# Patient Record
Sex: MALE | Race: WHITE | NOT HISPANIC OR LATINO | URBAN - METROPOLITAN AREA
[De-identification: names, ages, dates, MRNs, and addresses within clinical notes are randomized per-mention and may not be internally consistent; named-entity substitution may affect disease eponyms.]

---

## 2019-06-10 ENCOUNTER — HISTORICAL (OUTPATIENT)
Dept: ADMINISTRATIVE | Facility: HOSPITAL | Age: 29
End: 2019-06-10

## 2019-06-10 LAB
ABS NEUT (OLG): 3.1 X10(3)/MCL (ref 2.1–9.2)
ALBUMIN SERPL-MCNC: 4.8 GM/DL (ref 3.4–5)
ALBUMIN/GLOB SERPL: 1.85 {RATIO} (ref 1.5–2.5)
ALP SERPL-CCNC: 39 UNIT/L (ref 38–126)
ALT SERPL-CCNC: 41 UNIT/L (ref 7–52)
APPEARANCE, UA: CLEAR
AST SERPL-CCNC: 28 UNIT/L (ref 15–37)
BACTERIA #/AREA URNS AUTO: NORMAL /HPF
BILIRUB SERPL-MCNC: 0.4 MG/DL (ref 0.2–1)
BILIRUB UR QL STRIP: NEGATIVE MG/DL
BUN SERPL-MCNC: 14 MG/DL (ref 7–18)
CALCIUM SERPL-MCNC: 9.6 MG/DL (ref 8.5–10)
CHLORIDE SERPL-SCNC: 101 MMOL/L (ref 98–107)
CO2 SERPL-SCNC: 26 MMOL/L (ref 21–32)
COLOR UR: YELLOW
CREAT SERPL-MCNC: 1.03 MG/DL (ref 0.6–1.3)
DEPRECATED CALCIDIOL+CALCIFEROL SERPL-MC: 30 NG/ML (ref 30–80)
ERYTHROCYTE [DISTWIDTH] IN BLOOD BY AUTOMATED COUNT: 12.9 % (ref 11.5–17)
GLOBULIN SER-MCNC: 2.6 GM/DL (ref 1.2–3)
GLUCOSE (UA): NEGATIVE MG/DL
GLUCOSE SERPL-MCNC: 97 MG/DL (ref 74–106)
HCT VFR BLD AUTO: 41.9 % (ref 42–52)
HGB BLD-MCNC: 14.1 GM/DL (ref 14–18)
HGB UR QL STRIP: NEGATIVE UNIT/L
KETONES UR QL STRIP: NEGATIVE MG/DL
LEUKOCYTE ESTERASE UR QL STRIP: NEGATIVE UNIT/L
LYMPHOCYTES # BLD AUTO: 2.3 X10(3)/MCL (ref 0.6–3.4)
LYMPHOCYTES NFR BLD AUTO: 39.1 % (ref 13–40)
MCH RBC QN AUTO: 29.4 PG (ref 27–31.2)
MCHC RBC AUTO-ENTMCNC: 34 GM/DL (ref 32–36)
MCV RBC AUTO: 88 FL (ref 80–94)
MONOCYTES # BLD AUTO: 0.6 X10(3)/MCL (ref 0.1–1.3)
MONOCYTES NFR BLD AUTO: 10.4 % (ref 0.1–24)
NEUTROPHILS NFR BLD AUTO: 50.5 % (ref 47–80)
NITRITE UR QL STRIP.AUTO: NEGATIVE
PH UR STRIP: 6 [PH]
PLATELET # BLD AUTO: 188 X10(3)/MCL (ref 130–400)
PMV BLD AUTO: 9.1 FL (ref 9.4–12.4)
POTASSIUM SERPL-SCNC: 4.3 MMOL/L (ref 3.5–5.1)
PROT SERPL-MCNC: 7.4 GM/DL (ref 6.4–8.2)
PROT UR QL STRIP: NEGATIVE MG/DL
RBC # BLD AUTO: 4.79 X10(6)/MCL (ref 4.7–6.1)
RBC #/AREA URNS HPF: NORMAL /HPF
SODIUM SERPL-SCNC: 136 MMOL/L (ref 136–145)
SP GR UR STRIP: 1.02
SQUAMOUS EPITHELIAL, UA: NORMAL /LPF
TSH SERPL-ACNC: 0.76 MIU/ML (ref 0.35–4.94)
UROBILINOGEN UR STRIP-ACNC: 0.2 MG/DL
WBC # SPEC AUTO: 6 X10(3)/MCL (ref 4.5–11.5)
WBC #/AREA URNS AUTO: NORMAL /[HPF]

## 2022-04-07 ENCOUNTER — HISTORICAL (OUTPATIENT)
Dept: ADMINISTRATIVE | Facility: HOSPITAL | Age: 32
End: 2022-04-07

## 2022-04-24 VITALS
DIASTOLIC BLOOD PRESSURE: 61 MMHG | BODY MASS INDEX: 20.73 KG/M2 | HEIGHT: 62 IN | SYSTOLIC BLOOD PRESSURE: 110 MMHG | WEIGHT: 112.63 LBS

## 2022-05-01 NOTE — HISTORICAL [facility]
This is a historical note converted from Mayela. Formatting and pictures may have been removed.  Please reference Mayela for original formatting and attached multimedia. Chief Complaint  MEDICATION REFILL  History of Present Illness  Patient here to follow-up regarding his anxiety. ?He is using Valium?on an as-needed basis, but reports using it more frequently lately. ?He reports an increase in anxiety?and?now depression?over the past month. ?He increased his Zoloft?to 100 mg 2 to 3 weeks ago. ?He denies any suicidal or homicidal ideations. ?He reports increased fatigue?and?decrease?interest in his previous interests. ?He continues to live in [place]?and will be leaving [place] on June 18. He is unsure as to?when he?will return home.?He does not have an established medical provider?in [facility].  ?  He reports having a previous problem?with kidney stones. ?In April he reports a gross amount of blood in his urine.? He denies any recent?blood in his urine.? He denies any flank pain.  Review of Systems  General:?? Patient reports energy level is ?poor. Denies weight change.??Denies fever,chills, night sweats, or weakness. ?Reports fatigue.  Cardiovascular:?? Denies chest pain, palpitations, dyspnea on exertion, orthopnea.  Respiratory:?? No cough, wheezing, shortness of breath, or sputum.  GI:?? Denies nausea, emesis, constipation, diarrhea, melena, hematochezia or abdominal pain  :?? No frequency, urgency, hematuria, discharge, or incontinence  MS:?? Denies myalgias, arthralgias, joint effusion, edema, or weakness  Neuro:?? No headaches, numbness in extremities, tingling, dizziness, or weakness  Psych:?? Denies anxiety, depression, suicidal or homicidal ideations, or irritability  Endo:?? Denies polyuria, polydipsia, polyphagia  Heme:?? No abnormal bleeding or bruising. No lymph node enlargement or pain.  ?  Physical Exam  Vitals & Measurements  HR:?80(Peripheral)? BP:?106/62?  HT:?157.48?cm?  WT:?46.3?kg? BMI:?18.67?  General:?? Well-developed and??nourished, no apparent distress, alert and oriented??4.  Neck:?? Supple, no lymphadenopathy, no thyromegaly, no bruits, no jugular venous distention.  Cardiovascular:?? Regular rhythm and rate, no murmurs, radial and dorsal pedal pulses 2+ bilaterally.  Respiratory:?? Lungs clear to auscultation bilaterally, no wheezes, no crackles, no rhonchi.??Good air movement.  Abdomen:?? NABS, soft, nontender, no hepatosplenomegaly, no masses, no guarding or rebound.?  MS:?? No muscle tenderness, joints WN L, FROM, negative SLR, no?CCE.  Neuro:?? CN II-XII intact, reflexes 2+ throughout, no motor sensory deficits, negative cerebellar??tests.  Psych: Normal affect, patient calm, good historian, patient answers questions?appropriately. Good hygiene.  Heme:? No bruising or petechiae.  ?  Assessment/Plan  1.?Anxiety?F41.9  ?Will decrease Zoloft back down to 50 mg. ?Patient will start Wellbutrin XL?150 mg?daily 30x1.? Side effects with the medication were discussed which included?the potential for weight loss.??He is aware that he will need to?increase his intake to help maintain his weight. ?He is also?aware of the risk of developing suicidal or harmful ideations. ?He was instructed to stop the medication and/or seek emergency care if needed. ?He verbalized understanding.??He was also given information?on resources?available?to help him find a counselor or psychologist?in [facility].? He will find a primary care provider in [facility]?to follow-up with in 4 to 6 weeks.  ?  Valium?10 mg?#30?1 refill.  Ordered:  buPROPion, 150 mg = 1 tab(s), Oral, q24hr, # 30 tab(s), 1 Refill(s), Pharmacy: [facility]/pharmacy #[id number]  Comprehensive Metabolic Panel, Routine collect, 06/10/19 11:02:00 CDT, Blood, Stop date 06/10/19 11:02:00 CDT, Lab Collect, Fatigue  Anxiety  Depression  Hematuria, 06/10/19 11:02:00 CDT  Office/Outpatient Visit Level 4 Established 64480 PC, Anxiety  Depression   Hematuria  Fatigue, [facility], 06/10/19 10:51:00 CDT  Thyroid Stimulating Hormone, Routine collect, 06/10/19 11:02:00 CDT, Blood, Stop date 06/10/19 11:02:00 CDT, Lab Collect, Fatigue  Anxiety  Depression, 06/10/19 11:02:00 CDT  ?  2.?Depression?F32.9  ?As above  Ordered:  buPROPion, 150 mg = 1 tab(s), Oral, q24hr, # 30 tab(s), 1 Refill(s), Pharmacy: [facility]/pharmacy #[id number]  Comprehensive Metabolic Panel, Routine collect, 06/10/19 11:02:00 CDT, Blood, Stop date 06/10/19 11:02:00 CDT, Lab Collect, Fatigue  Anxiety  Depression  Hematuria, 06/10/19 11:02:00 CDT  Office/Outpatient Visit Level 4 Established 27276 PC, Anxiety  Depression  Hematuria  Fatigue, [facility], 06/10/19 10:51:00 CDT  Thyroid Stimulating Hormone, Routine collect, 06/10/19 11:02:00 CDT, Blood, Stop date 06/10/19 11:02:00 CDT, Lab Collect, Fatigue  Anxiety  Depression, 06/10/19 11:02:00 CDT  ?  3.?Hematuria?R31.9  ?Urinalysis today.  Ordered:  Comprehensive Metabolic Panel, Routine collect, 06/10/19 11:02:00 CDT, Blood, Stop date 06/10/19 11:02:00 CDT, Lab Collect, Fatigue  Anxiety  Depression  Hematuria, 06/10/19 11:02:00 CDT  Office/Outpatient Visit Level 4 Established 17148 PC, Anxiety  Depression  Hematuria  Fatigue, [facility], 06/10/19 10:51:00 CDT  Urinalysis Complete no reflex, Routine collect, Urine, 06/10/19 11:02:00 CDT, Stop date 06/10/19 11:02:00 CDT, Nurse collect, Hematuria  ?  4.?Fatigue?R53.83  ?Labs:?CBC, CMP,?TSH, vitamin D today.  Ordered:  Comprehensive Metabolic Panel, Routine collect, 06/10/19 11:02:00 CDT, Blood, Stop date 06/10/19 11:02:00 CDT, Lab Collect, Fatigue  Anxiety  Depression  Hematuria, 06/10/19 11:02:00 CDT  Office/Outpatient Visit Level 4 Established 04939 PC, Anxiety  Depression  Hematuria  Fatigue, [facility], 06/10/19 10:51:00 CDT  Thyroid Stimulating Hormone, Routine collect, 06/10/19 11:02:00 CDT, Blood, Stop date 06/10/19 11:02:00 CDT, Lab Collect, Fatigue  Anxiety   Depression, 06/10/19 11:02:00 CDT  Vitamin D, 25-Hydroxy Level, Routine collect, 06/10/19 11:02:00 CDT, Blood, Stop date 06/10/19 11:02:00 CDT, Lab Collect, Fatigue, 06/10/19 11:02:00 CDT  ?  5.?Weight loss?R63.4  ?As above. ?We discussed ways?to?help improve his diet?to help increase his weight.  ?  Orders:  diazepam, See Instructions, TAKE 1 TABLET BY MOUTH EVERY 8 HOURS AS NEEDED FOR ANXIETY, # 30 tab(s), 0 Refill(s), Pharmacy: [facility]/pharmacy #[id number]  sertraline, 50 mg = 1 tab(s), Oral, Daily, # 90 tab(s), 0 Refill(s), Pharmacy: [facility]/pharmacy #[id number]  Clinic Follow-up PRN, 06/10/19 10:51:00 CDT, [facility], Future Order  Referrals  Clinic Follow-up PRN, 06/10/19 10:51:00 CDT, [facility], Future Order   Problem List/Past Medical History  Ongoing  Anxiety  Weight loss  Historical  No qualifying data  Medications  DIAZepam 10 mg oral tablet, See Instructions  sertraline 50 mg oral tablet, 50 mg= 1 tab(s), Oral, Daily  Wellbutrin  mg/24 hours oral tablet, extended release, 150 mg= 1 tab(s), Oral, q24hr, 1 refills  Allergies  No Known Medication Allergies  Social History  Abuse/Neglect  No, 06/10/2019  Alcohol  1-2 times per month, 06/10/2019  Employment/School  Employed, Work/School description: Actor., 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 ??OverDue  ??? ? ? ?Alcohol Misuse Screening due??01/01/19??and every 1??year(s)  ??? ??Due?  ??? ? ? ?ADL Screening due??06/10/19??and every 1??year(s)  ??? ? ? ?Tetanus Vaccine due??06/10/19??and every 10??year(s)  ??? ??Due In Future?  ??? ? ? ?Obesity Screening not due until??01/01/20??and every 1??year(s)  ??? ? ? ?Blood Pressure Screening not due until??06/09/20??and every 1??year(s)  ??? ? ? ?Body Mass Index  Check not due until??06/09/20??and every 1??year(s)  ???Satisfied?(in the past 1 year)  ??? ??Satisfied?  ??? ? ? ?Blood Pressure Screening on??06/10/19.??Satisfied by Graciela Gutierrez LPN  ??? ? ? ?Body Mass Index Check on??06/10/19.??Satisfied by Graciela Gutierrez LPN  ??? ? ? ?Obesity Screening on??06/10/19.??Satisfied by Graciela Gutierrez LPN  ?  ?      Patient condition discussed?in detail with nurse practitioner.??Agree with plan of care?and follow-up.  ?

## 2022-10-11 ENCOUNTER — OFFICE VISIT (OUTPATIENT)
Dept: FAMILY MEDICINE | Facility: CLINIC | Age: 32
End: 2022-10-11
Payer: COMMERCIAL

## 2022-10-11 VITALS
SYSTOLIC BLOOD PRESSURE: 105 MMHG | HEART RATE: 80 BPM | HEIGHT: 62 IN | OXYGEN SATURATION: 94 % | TEMPERATURE: 99 F | DIASTOLIC BLOOD PRESSURE: 76 MMHG | BODY MASS INDEX: 20.48 KG/M2 | RESPIRATION RATE: 20 BRPM | WEIGHT: 111.31 LBS

## 2022-10-11 DIAGNOSIS — Z00.00 ENCOUNTER FOR WELLNESS EXAMINATION: Primary | ICD-10-CM

## 2022-10-11 DIAGNOSIS — F41.9 ANXIETY: ICD-10-CM

## 2022-10-11 DIAGNOSIS — F32.A DEPRESSION, UNSPECIFIED DEPRESSION TYPE: ICD-10-CM

## 2022-10-11 LAB
ALBUMIN SERPL-MCNC: 4.5 GM/DL (ref 3.5–5)
ALBUMIN/GLOB SERPL: 1.6 RATIO (ref 1.1–2)
ALP SERPL-CCNC: 56 UNIT/L (ref 40–150)
ALT SERPL-CCNC: 13 UNIT/L (ref 0–55)
APPEARANCE UR: CLEAR
AST SERPL-CCNC: 18 UNIT/L (ref 5–34)
BACTERIA #/AREA URNS AUTO: ABNORMAL /HPF
BASOPHILS # BLD AUTO: 0.05 X10(3)/MCL (ref 0–0.2)
BASOPHILS NFR BLD AUTO: 0.7 %
BILIRUB UR QL STRIP.AUTO: NEGATIVE MG/DL
BILIRUBIN DIRECT+TOT PNL SERPL-MCNC: 0.2 MG/DL
BUN SERPL-MCNC: 21.9 MG/DL (ref 8.9–20.6)
CALCIUM SERPL-MCNC: 9.9 MG/DL (ref 8.4–10.2)
CHLORIDE SERPL-SCNC: 103 MMOL/L (ref 98–107)
CHOLEST SERPL-MCNC: 189 MG/DL
CHOLEST/HDLC SERPL: 3 {RATIO} (ref 0–5)
CO2 SERPL-SCNC: 27 MMOL/L (ref 22–29)
COLOR UR AUTO: YELLOW
CREAT SERPL-MCNC: 1 MG/DL (ref 0.73–1.18)
EOSINOPHIL # BLD AUTO: 0.14 X10(3)/MCL (ref 0–0.9)
EOSINOPHIL NFR BLD AUTO: 1.9 %
ERYTHROCYTE [DISTWIDTH] IN BLOOD BY AUTOMATED COUNT: 12.2 % (ref 11.5–17)
EST. AVERAGE GLUCOSE BLD GHB EST-MCNC: 99.7 MG/DL
GFR SERPLBLD CREATININE-BSD FMLA CKD-EPI: >60 MLS/MIN/1.73/M2
GLOBULIN SER-MCNC: 2.9 GM/DL (ref 2.4–3.5)
GLUCOSE SERPL-MCNC: 89 MG/DL (ref 74–100)
GLUCOSE UR QL STRIP.AUTO: NORMAL MG/DL
HAV IGM SERPL QL IA: NONREACTIVE
HBA1C MFR BLD: 5.1 %
HBV CORE IGM SERPL QL IA: NONREACTIVE
HBV SURFACE AG SERPL QL IA: NONREACTIVE
HCT VFR BLD AUTO: 38.9 % (ref 42–52)
HCV AB SERPL QL IA: NONREACTIVE
HDLC SERPL-MCNC: 56 MG/DL (ref 35–60)
HGB BLD-MCNC: 12.8 GM/DL (ref 14–18)
HIV 1+2 AB+HIV1 P24 AG SERPL QL IA: NONREACTIVE
HYALINE CASTS #/AREA URNS LPF: ABNORMAL /LPF
IMM GRANULOCYTES # BLD AUTO: 0.01 X10(3)/MCL (ref 0–0.04)
IMM GRANULOCYTES NFR BLD AUTO: 0.1 %
KETONES UR QL STRIP.AUTO: NEGATIVE MG/DL
LDLC SERPL CALC-MCNC: 101 MG/DL (ref 50–140)
LEUKOCYTE ESTERASE UR QL STRIP.AUTO: NEGATIVE UNIT/L
LYMPHOCYTES # BLD AUTO: 2.89 X10(3)/MCL (ref 0.6–4.6)
LYMPHOCYTES NFR BLD AUTO: 40.3 %
MCH RBC QN AUTO: 28.4 PG (ref 27–31)
MCHC RBC AUTO-ENTMCNC: 32.9 MG/DL (ref 33–36)
MCV RBC AUTO: 86.3 FL (ref 80–94)
MONOCYTES # BLD AUTO: 0.6 X10(3)/MCL (ref 0.1–1.3)
MONOCYTES NFR BLD AUTO: 8.4 %
MUCOUS THREADS URNS QL MICRO: ABNORMAL /LPF
NEUTROPHILS # BLD AUTO: 3.5 X10(3)/MCL (ref 2.1–9.2)
NEUTROPHILS NFR BLD AUTO: 48.6 %
NITRITE UR QL STRIP.AUTO: NEGATIVE
NRBC BLD AUTO-RTO: 0 %
PATH REV: NORMAL
PH UR STRIP.AUTO: 6 [PH]
PLATELET # BLD AUTO: 184 X10(3)/MCL (ref 130–400)
PMV BLD AUTO: 10.1 FL (ref 7.4–10.4)
POTASSIUM SERPL-SCNC: 4.6 MMOL/L (ref 3.5–5.1)
PROT SERPL-MCNC: 7.4 GM/DL (ref 6.4–8.3)
PROT UR QL STRIP.AUTO: NEGATIVE MG/DL
RBC # BLD AUTO: 4.51 X10(6)/MCL (ref 4.7–6.1)
RBC #/AREA URNS AUTO: ABNORMAL /HPF
RBC UR QL AUTO: NEGATIVE UNIT/L
SODIUM SERPL-SCNC: 140 MMOL/L (ref 136–145)
SP GR UR STRIP.AUTO: 1.02
SQUAMOUS #/AREA URNS LPF: ABNORMAL /HPF
T PALLIDUM AB SER QL: NONREACTIVE
T4 FREE SERPL-MCNC: 0.98 NG/DL (ref 0.7–1.48)
TRIGL SERPL-MCNC: 162 MG/DL (ref 34–140)
TSH SERPL-ACNC: 1.27 UIU/ML (ref 0.35–4.94)
UROBILINOGEN UR STRIP-ACNC: NORMAL MG/DL
VLDLC SERPL CALC-MCNC: 32 MG/DL
WBC # SPEC AUTO: 7.2 X10(3)/MCL (ref 4.5–11.5)
WBC #/AREA URNS AUTO: ABNORMAL /HPF

## 2022-10-11 PROCEDURE — 3078F DIAST BP <80 MM HG: CPT | Mod: CPTII,,,

## 2022-10-11 PROCEDURE — 3074F SYST BP LT 130 MM HG: CPT | Mod: CPTII,,,

## 2022-10-11 PROCEDURE — 3008F PR BODY MASS INDEX (BMI) DOCUMENTED: ICD-10-PCS | Mod: CPTII,,,

## 2022-10-11 PROCEDURE — 3074F PR MOST RECENT SYSTOLIC BLOOD PRESSURE < 130 MM HG: ICD-10-PCS | Mod: CPTII,,,

## 2022-10-11 PROCEDURE — 1159F MED LIST DOCD IN RCRD: CPT | Mod: CPTII,,,

## 2022-10-11 PROCEDURE — 36415 COLL VENOUS BLD VENIPUNCTURE: CPT

## 2022-10-11 PROCEDURE — 99385 PREV VISIT NEW AGE 18-39: CPT | Mod: S$PBB,,,

## 2022-10-11 PROCEDURE — 83036 HEMOGLOBIN GLYCOSYLATED A1C: CPT

## 2022-10-11 PROCEDURE — 84439 ASSAY OF FREE THYROXINE: CPT

## 2022-10-11 PROCEDURE — 1160F PR REVIEW ALL MEDS BY PRESCRIBER/CLIN PHARMACIST DOCUMENTED: ICD-10-PCS | Mod: CPTII,,,

## 2022-10-11 PROCEDURE — 1160F RVW MEDS BY RX/DR IN RCRD: CPT | Mod: CPTII,,,

## 2022-10-11 PROCEDURE — 80061 LIPID PANEL: CPT

## 2022-10-11 PROCEDURE — 81001 URINALYSIS AUTO W/SCOPE: CPT

## 2022-10-11 PROCEDURE — 87389 HIV-1 AG W/HIV-1&-2 AB AG IA: CPT

## 2022-10-11 PROCEDURE — 3078F PR MOST RECENT DIASTOLIC BLOOD PRESSURE < 80 MM HG: ICD-10-PCS | Mod: CPTII,,,

## 2022-10-11 PROCEDURE — 3008F BODY MASS INDEX DOCD: CPT | Mod: CPTII,,,

## 2022-10-11 PROCEDURE — 1159F PR MEDICATION LIST DOCUMENTED IN MEDICAL RECORD: ICD-10-PCS | Mod: CPTII,,,

## 2022-10-11 PROCEDURE — 80053 COMPREHEN METABOLIC PANEL: CPT

## 2022-10-11 PROCEDURE — 99385 PR PREVENTIVE VISIT,NEW,18-39: ICD-10-PCS | Mod: S$PBB,,,

## 2022-10-11 PROCEDURE — 99214 OFFICE O/P EST MOD 30 MIN: CPT | Mod: PBBFAC,PN

## 2022-10-11 PROCEDURE — 84443 ASSAY THYROID STIM HORMONE: CPT

## 2022-10-11 PROCEDURE — 80074 ACUTE HEPATITIS PANEL: CPT

## 2022-10-11 PROCEDURE — 86780 TREPONEMA PALLIDUM: CPT

## 2022-10-11 PROCEDURE — 85025 COMPLETE CBC W/AUTO DIFF WBC: CPT

## 2022-10-11 RX ORDER — DIAZEPAM 10 MG/1
TABLET ORAL
COMMUNITY
End: 2023-05-30 | Stop reason: SDUPTHER

## 2022-10-11 RX ORDER — SERTRALINE HYDROCHLORIDE 100 MG/1
TABLET, FILM COATED ORAL
COMMUNITY
End: 2022-10-12 | Stop reason: SDUPTHER

## 2022-10-11 NOTE — PROGRESS NOTES
Patient Name: Channing Lennon   : 1990  MRN: [id number]     Subjective:   Patient ID: Channing Lennon is a 31 y.o. male.    Chief Complaint:   Chief Complaint   Patient presents with    Establish Care        HPI: 10/11/2022:  Patient presents to clinic today to establish care.  He recently lost private insurance and has been having to find new providers that except his Medicaid.  He has history of anxiety and depression which is controlled with Zoloft and Valium p.r.n..  At length discussed with patient how that is not medicine that be filled in primary care setting and we can set him up with a different psychiatrist for formal evaluation and treatment.  Patient is open to this plan.  [place] at length discussion with patient about needing to be in state in order to effectively manage conditions.  Unsure of any previous health maintenance the patient denies any acute complaints or family histories      ROS:  Review of Systems   Constitutional:  Negative for chills, fever and weight loss.   HENT:  Negative for ear discharge, nosebleeds and tinnitus.    Eyes:  Negative for blurred vision, photophobia and pain.   Respiratory:  Negative for cough, shortness of breath, wheezing and stridor.    Cardiovascular:  Negative for chest pain, palpitations and orthopnea.   Gastrointestinal:  Negative for abdominal pain, heartburn and nausea.   Genitourinary:  Negative for dysuria, frequency, hematuria and urgency.   Musculoskeletal:  Negative for falls and myalgias.   Skin:  Negative for itching and rash.   Neurological:  Negative for dizziness, sensory change, speech change, focal weakness, seizures, weakness and headaches.   Endo/Heme/Allergies:  Negative for environmental allergies. Does not bruise/bleed easily.   Psychiatric/Behavioral:  Positive for depression. Negative for hallucinations and suicidal ideas. The patient is nervous/anxious.     History:   History reviewed. No pertinent  "past medical history.   History reviewed. No pertinent surgical history.  History reviewed. No pertinent family history.   Social History     Tobacco Use    Smoking status: Never    Smokeless tobacco: Never   Substance and Sexual Activity    Alcohol use: Yes     Alcohol/week: 1.0 standard drink     Types: 1 Drinks containing 0.5 oz of alcohol per week    Drug use: Yes     Types: Marijuana     Comment: at night    Sexual activity: Not Currently        Allergies: Review of patient's allergies indicates:  No Known Allergies  Objective:     Vitals:    10/11/22 0823   BP: 105/76   Pulse: 80   Resp: 20   Temp: 98.5 °F (36.9 °C)   TempSrc: Oral   SpO2: (!) 94%   Weight: 50.5 kg (111 lb 4.8 oz)   Height: 5' 2" (1.575 m)     Body mass index is 20.36 kg/m².     Physical Examination:   Physical Exam  Vitals reviewed.   Constitutional:       Appearance: Normal appearance. He is normal weight.   HENT:      Head: Normocephalic.      Right Ear: Tympanic membrane, ear canal and external ear normal.      Left Ear: Tympanic membrane, ear canal and external ear normal.      Nose: Nose normal.      Mouth/Throat:      Mouth: Mucous membranes are moist.      Pharynx: Oropharynx is clear.   Eyes:      Extraocular Movements: Extraocular movements intact.      Conjunctiva/sclera: Conjunctivae normal.      Pupils: Pupils are equal, round, and reactive to light.   Cardiovascular:      Rate and Rhythm: Normal rate and regular rhythm.      Pulses: Normal pulses.      Heart sounds: Normal heart sounds.   Pulmonary:      Effort: Pulmonary effort is normal.      Breath sounds: Normal breath sounds.   Abdominal:      General: Abdomen is flat. Bowel sounds are normal.      Palpations: Abdomen is soft.   Musculoskeletal:         General: Normal range of motion.      Cervical back: Normal range of motion and neck supple.   Skin:     General: Skin is warm and dry.   Neurological:      General: No focal deficit present.      Mental Status: He is alert " and oriented to person, place, and time.   Psychiatric:         Mood and Affect: Mood normal.         Behavior: Behavior normal.       Assessment:     Problem List Items Addressed This Visit          Psychiatric    Anxiety (Chronic)    Overview       Practice deep breathing or abdominal breathing exercises when anxiety occurs.  Exercise daily. Get sunlight daily.  Avoid caffeine, alcohol and stimulants.  Practice positive phrases and repeat throughout the day, yoga, lavender scents or Chamomile tea will help anxiety.  Set healthy boundaries, avoid people and conversations that increase stress.  Reports any symptoms of suicidal or homicidal ideations immediately, if clinic is closed go to nearest emergency room.             Current Assessment & Plan     Continue valium prescribed by previous provider. Referral to psych for formal eval/treatment plan.         Relevant Orders    Ambulatory referral/consult to Psychiatry    Depression (Chronic)    Overview       Read positive daily meditations, avoid negative media, set healthy boundaries.  Exercise daily, keep consistent sleep pattern, eat a healthy diet.  Establish good social support, make changes to reduce stress.  Reports any symptoms of suicidal/homicidal ideations or self harm immediately, if clinic is closed go to nearest emergency room.           Current Assessment & Plan     Continue zoloft 100mg         Relevant Orders    Ambulatory referral/consult to Psychiatry     Other Visit Diagnoses       Encounter for wellness examination    -  Primary    Relevant Orders    TSH    T4, Free    Hemoglobin A1C    SYPHILIS ANTIBODY (WITH REFLEX RPR)    Hepatitis Panel, Acute    Lipid Panel    CBC Auto Differential    Comprehensive Metabolic Panel    HIV 1/2 Ag/Ab (4th Gen)    Urinalysis            Plan:   Channing was seen today for establish care.    Diagnoses and all orders for this visit:    Encounter for wellness examination  -     TSH  -     T4, Free  -     Hemoglobin  A1C  -     SYPHILIS ANTIBODY (WITH REFLEX RPR)  -     Hepatitis Panel, Acute  -     Lipid Panel  -     CBC Auto Differential  -     Comprehensive Metabolic Panel  -     HIV 1/2 Ag/Ab (4th Gen)  -     Urinalysis    Anxiety  -     Ambulatory referral/consult to Psychiatry; Future    Depression, unspecified depression type  -     Ambulatory referral/consult to Psychiatry; Future     Follow up in about 2 weeks (around 10/25/2022) for Virtual Visit, review labs.     This note was created with the assistance of Dragon voice recognition software or phone dictation. There may be transcription errors as a result of using this technology however minimal. Effort has been made to assure accuracy of transcription but any obvious errors or omissions should be clarified with the author of the document

## 2022-10-11 NOTE — ASSESSMENT & PLAN NOTE
Continue valium prescribed by previous provider. Referral to psych for formal eval/treatment plan.

## 2022-10-12 ENCOUNTER — PATIENT MESSAGE (OUTPATIENT)
Dept: FAMILY MEDICINE | Facility: CLINIC | Age: 32
End: 2022-10-12
Payer: COMMERCIAL

## 2022-10-12 ENCOUNTER — TELEPHONE (OUTPATIENT)
Dept: FAMILY MEDICINE | Facility: CLINIC | Age: 32
End: 2022-10-12
Payer: COMMERCIAL

## 2022-10-12 RX ORDER — SERTRALINE HYDROCHLORIDE 100 MG/1
100 TABLET, FILM COATED ORAL DAILY
Qty: 30 TABLET | Refills: 0 | Status: SHIPPED | OUTPATIENT
Start: 2022-10-12 | End: 2022-10-26 | Stop reason: SDUPTHER

## 2022-10-26 ENCOUNTER — OFFICE VISIT (OUTPATIENT)
Dept: FAMILY MEDICINE | Facility: CLINIC | Age: 32
End: 2022-10-26
Payer: COMMERCIAL

## 2022-10-26 DIAGNOSIS — F32.A DEPRESSION, UNSPECIFIED DEPRESSION TYPE: Primary | Chronic | ICD-10-CM

## 2022-10-26 DIAGNOSIS — F41.9 ANXIETY: Chronic | ICD-10-CM

## 2022-10-26 DIAGNOSIS — D64.9 ANEMIA, UNSPECIFIED TYPE: ICD-10-CM

## 2022-10-26 PROCEDURE — 1160F PR REVIEW ALL MEDS BY PRESCRIBER/CLIN PHARMACIST DOCUMENTED: ICD-10-PCS | Mod: CPTII,95,,

## 2022-10-26 PROCEDURE — 99214 OFFICE O/P EST MOD 30 MIN: CPT | Mod: 95,,,

## 2022-10-26 PROCEDURE — 1160F RVW MEDS BY RX/DR IN RCRD: CPT | Mod: CPTII,95,,

## 2022-10-26 PROCEDURE — 1159F MED LIST DOCD IN RCRD: CPT | Mod: CPTII,95,,

## 2022-10-26 PROCEDURE — 1159F PR MEDICATION LIST DOCUMENTED IN MEDICAL RECORD: ICD-10-PCS | Mod: CPTII,95,,

## 2022-10-26 PROCEDURE — 99214 PR OFFICE/OUTPT VISIT, EST, LEVL IV, 30-39 MIN: ICD-10-PCS | Mod: 95,,,

## 2022-10-26 RX ORDER — SERTRALINE HYDROCHLORIDE 100 MG/1
100 TABLET, FILM COATED ORAL 2 TIMES DAILY
Qty: 60 TABLET | Refills: 1 | Status: SHIPPED | OUTPATIENT
Start: 2022-10-26 | End: 2022-12-19 | Stop reason: SDUPTHER

## 2022-10-26 NOTE — ASSESSMENT & PLAN NOTE
Patient states he was actually on zoloft 100mg morning and night, he missed appointment with psych previous and has rescheduled to December.

## 2022-10-26 NOTE — PROGRESS NOTES
Audio Only Telehealth Visit     The patient location is:  [place]  The chief complaint leading to consultation is:  Review labs, medication refill  Visit type: Virtual visit with audio only (telephone)  Total time spent with patient:  15 minutes     The reason for the audio only service rather than synchronous audio and video virtual visit was related to technical difficulties or patient preference/necessity.     Each patient to whom I provide medical services by telemedicine is:  (1) informed of the relationship between the physician and patient and the respective role of any other health care provider with respect to management of the patient; and (2) notified that they may decline to receive medical services by telemedicine and may withdraw from such care at any time. Patient verbally consented to receive this service via voice-only telephone call.    Patient Name: Channing Lennon   : 1990  MRN: [id number]     Subjective:   Patient ID: Channing Lennon is a 31 y.o. male.    Chief Complaint: No chief complaint on file.       HPI: 10/26/2022:  Today to review labs with patient, all questions answered to patient's understanding.  Patient did state that he has been anemic previously on blood work but there was no known cause or issues.  No pertinent family history correlated to this.  Will recheck this level again in 3 months face-to-face to ensure that this is a stable level.  Patient missed his appointment with psychiatrist to establish care and have medication management, he has reschedule this for December nose requesting refills of his antidepressants until this appointment.    10/11/2022:  Patient presents to clinic today to establish care.  He recently lost private insurance and has been having to find new providers that except his Medicaid.  He has history of anxiety and depression which is controlled with Zoloft and Valium p.r.n..  At length discussed with patient how that is not medicine that be filled in  primary care setting and we can set him up with a different psychiatrist for formal evaluation and treatment.  Patient is open to this plan.  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  Unsure of any previous health maintenance the patient denies any acute complaints or family histories      ROS:  Review of Systems   All other systems reviewed and are negative.   History:   History reviewed. No pertinent past medical history.   History reviewed. No pertinent surgical history.  History reviewed. No pertinent family history.   Social History     Tobacco Use    Smoking status: Never    Smokeless tobacco: Never   Substance and Sexual Activity    Alcohol use: Yes     Alcohol/week: 1.0 standard drink     Types: 1 Drinks containing 0.5 oz of alcohol per week    Drug use: Yes     Types: Marijuana     Comment: at night    Sexual activity: Not Currently        Allergies: Review of patient's allergies indicates:  No Known Allergies  Objective:   There were no vitals filed for this visit.  There is no height or weight on file to calculate BMI.     Physical Examination:   Physical Exam  Constitutional:       General: He is not in acute distress.     Comments: Limited Physical Exam due to telemedicine visit   Pulmonary:      Effort: Pulmonary effort is normal. No respiratory distress.   Neurological:      Mental Status: He is alert.   Psychiatric:         Mood and Affect: Mood normal.         Behavior: Behavior normal.       Assessment:     Problem List Items Addressed This Visit          Psychiatric    Anxiety (Chronic)    Overview       Practice deep breathing or abdominal breathing exercises when anxiety occurs.  Exercise daily. Get sunlight daily.  Avoid caffeine, alcohol and stimulants.  Practice positive phrases and repeat throughout the day, yoga, lavender scents or Chamomile tea will help anxiety.  Set healthy  boundaries, avoid people and conversations that increase stress.  Reports any symptoms of suicidal or homicidal ideations immediately, if clinic is closed go to nearest emergency room.             Relevant Medications    sertraline (ZOLOFT) 100 MG tablet    Depression - Primary (Chronic)    Overview       Read positive daily meditations, avoid negative media, set healthy boundaries.  Exercise daily, keep consistent sleep pattern, eat a healthy diet.  Establish good social support, make changes to reduce stress.  Reports any symptoms of suicidal/homicidal ideations or self harm immediately, if clinic is closed go to nearest emergency room.           Current Assessment & Plan     Patient states he was actually on zoloft 100mg morning and night, he missed appointment with psych previous and has rescheduled to December.          Relevant Medications    sertraline (ZOLOFT) 100 MG tablet     Other Visit Diagnoses       Anemia, unspecified type                Plan:   Diagnoses and all orders for this visit:    Depression, unspecified depression type  -     sertraline (ZOLOFT) 100 MG tablet; Take 1 tablet (100 mg total) by mouth 2 (two) times a day.    Anxiety  -     sertraline (ZOLOFT) 100 MG tablet; Take 1 tablet (100 mg total) by mouth 2 (two) times a day.    Anemia, unspecified type     Follow up in about 3 months (around 1/26/2023) for routine labs recheck, PERNELL/PHQ, med change fu.       This note was created with the assistance of a voice recognition software or phone dictation. There may be transcription errors as a result of using this technology however minimal. Effort has been made to assure accuracy of transcription but any obvious errors or omissions should be clarified with the author of the document      This service was not originating from a related E/M service provided within the previous 7 days nor will  to an E/M service or procedure within the next 24 hours or my soonest available appointment.   Prevailing standard of care was able to be met in this audio-only visit.

## 2022-12-19 ENCOUNTER — OFFICE VISIT (OUTPATIENT)
Dept: BEHAVIORAL HEALTH | Facility: CLINIC | Age: 32
End: 2022-12-19
Payer: COMMERCIAL

## 2022-12-19 VITALS
SYSTOLIC BLOOD PRESSURE: 123 MMHG | DIASTOLIC BLOOD PRESSURE: 85 MMHG | OXYGEN SATURATION: 100 % | BODY MASS INDEX: 20.85 KG/M2 | WEIGHT: 114 LBS | HEART RATE: 75 BPM | TEMPERATURE: 98 F

## 2022-12-19 DIAGNOSIS — F33.2 SEVERE EPISODE OF RECURRENT MAJOR DEPRESSIVE DISORDER, WITHOUT PSYCHOTIC FEATURES: Primary | ICD-10-CM

## 2022-12-19 DIAGNOSIS — F41.1 GAD (GENERALIZED ANXIETY DISORDER): ICD-10-CM

## 2022-12-19 LAB
AMPHET UR QL SCN: NEGATIVE
BARBITURATE SCN PRESENT UR: NEGATIVE
BENZODIAZ UR QL SCN: POSITIVE
CANNABINOIDS UR QL SCN: POSITIVE
COCAINE UR QL SCN: NEGATIVE
FENTANYL UR QL SCN: NEGATIVE
MDMA UR QL SCN: NEGATIVE
OPIATES UR QL SCN: NEGATIVE
PCP UR QL: NEGATIVE
PH UR: 6.5 [PH] (ref 3–11)

## 2022-12-19 PROCEDURE — 3008F PR BODY MASS INDEX (BMI) DOCUMENTED: ICD-10-PCS | Mod: CPTII,,, | Performed by: STUDENT IN AN ORGANIZED HEALTH CARE EDUCATION/TRAINING PROGRAM

## 2022-12-19 PROCEDURE — 99213 OFFICE O/P EST LOW 20 MIN: CPT | Mod: PBBFAC,PN | Performed by: STUDENT IN AN ORGANIZED HEALTH CARE EDUCATION/TRAINING PROGRAM

## 2022-12-19 PROCEDURE — 3008F BODY MASS INDEX DOCD: CPT | Mod: CPTII,,, | Performed by: STUDENT IN AN ORGANIZED HEALTH CARE EDUCATION/TRAINING PROGRAM

## 2022-12-19 PROCEDURE — 1159F MED LIST DOCD IN RCRD: CPT | Mod: CPTII,,, | Performed by: STUDENT IN AN ORGANIZED HEALTH CARE EDUCATION/TRAINING PROGRAM

## 2022-12-19 PROCEDURE — 3079F PR MOST RECENT DIASTOLIC BLOOD PRESSURE 80-89 MM HG: ICD-10-PCS | Mod: CPTII,,, | Performed by: STUDENT IN AN ORGANIZED HEALTH CARE EDUCATION/TRAINING PROGRAM

## 2022-12-19 PROCEDURE — 3074F SYST BP LT 130 MM HG: CPT | Mod: CPTII,,, | Performed by: STUDENT IN AN ORGANIZED HEALTH CARE EDUCATION/TRAINING PROGRAM

## 2022-12-19 PROCEDURE — 3079F DIAST BP 80-89 MM HG: CPT | Mod: CPTII,,, | Performed by: STUDENT IN AN ORGANIZED HEALTH CARE EDUCATION/TRAINING PROGRAM

## 2022-12-19 PROCEDURE — 1159F PR MEDICATION LIST DOCUMENTED IN MEDICAL RECORD: ICD-10-PCS | Mod: CPTII,,, | Performed by: STUDENT IN AN ORGANIZED HEALTH CARE EDUCATION/TRAINING PROGRAM

## 2022-12-19 PROCEDURE — 3074F PR MOST RECENT SYSTOLIC BLOOD PRESSURE < 130 MM HG: ICD-10-PCS | Mod: CPTII,,, | Performed by: STUDENT IN AN ORGANIZED HEALTH CARE EDUCATION/TRAINING PROGRAM

## 2022-12-19 PROCEDURE — 1160F PR REVIEW ALL MEDS BY PRESCRIBER/CLIN PHARMACIST DOCUMENTED: ICD-10-PCS | Mod: CPTII,,, | Performed by: STUDENT IN AN ORGANIZED HEALTH CARE EDUCATION/TRAINING PROGRAM

## 2022-12-19 PROCEDURE — 1160F RVW MEDS BY RX/DR IN RCRD: CPT | Mod: CPTII,,, | Performed by: STUDENT IN AN ORGANIZED HEALTH CARE EDUCATION/TRAINING PROGRAM

## 2022-12-19 PROCEDURE — 99204 OFFICE O/P NEW MOD 45 MIN: CPT | Mod: S$PBB,,, | Performed by: STUDENT IN AN ORGANIZED HEALTH CARE EDUCATION/TRAINING PROGRAM

## 2022-12-19 PROCEDURE — 99204 PR OFFICE/OUTPT VISIT, NEW, LEVL IV, 45-59 MIN: ICD-10-PCS | Mod: S$PBB,,, | Performed by: STUDENT IN AN ORGANIZED HEALTH CARE EDUCATION/TRAINING PROGRAM

## 2022-12-19 PROCEDURE — 80307 DRUG TEST PRSMV CHEM ANLYZR: CPT | Performed by: STUDENT IN AN ORGANIZED HEALTH CARE EDUCATION/TRAINING PROGRAM

## 2022-12-19 RX ORDER — FLUOXETINE HYDROCHLORIDE 40 MG/1
40 CAPSULE ORAL DAILY
Qty: 30 CAPSULE | Refills: 2 | Status: SHIPPED | OUTPATIENT
Start: 2022-12-19 | End: 2023-02-27 | Stop reason: SDUPTHER

## 2022-12-19 RX ORDER — SERTRALINE HYDROCHLORIDE 100 MG/1
100 TABLET, FILM COATED ORAL DAILY
Qty: 30 TABLET | Refills: 2 | Status: SHIPPED | OUTPATIENT
Start: 2022-12-19 | End: 2023-02-27 | Stop reason: ALTCHOICE
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
## 2023-02-27 ENCOUNTER — OFFICE VISIT (OUTPATIENT)
Dept: FAMILY MEDICINE | Facility: CLINIC | Age: 33
End: 2023-02-27
Payer: COMMERCIAL

## 2023-02-27 ENCOUNTER — OFFICE VISIT (OUTPATIENT)
Dept: BEHAVIORAL HEALTH | Facility: CLINIC | Age: 33
End: 2023-02-27
Payer: COMMERCIAL

## 2023-02-27 VITALS
SYSTOLIC BLOOD PRESSURE: 112 MMHG | HEART RATE: 80 BPM | BODY MASS INDEX: 20.69 KG/M2 | OXYGEN SATURATION: 100 % | DIASTOLIC BLOOD PRESSURE: 76 MMHG | WEIGHT: 113.13 LBS | TEMPERATURE: 99 F

## 2023-02-27 VITALS
TEMPERATURE: 98 F | HEART RATE: 83 BPM | HEIGHT: 62 IN | OXYGEN SATURATION: 95 % | RESPIRATION RATE: 18 BRPM | BODY MASS INDEX: 21.07 KG/M2 | SYSTOLIC BLOOD PRESSURE: 110 MMHG | DIASTOLIC BLOOD PRESSURE: 71 MMHG | WEIGHT: 114.5 LBS

## 2023-02-27 DIAGNOSIS — F33.2 SEVERE EPISODE OF RECURRENT MAJOR DEPRESSIVE DISORDER, WITHOUT PSYCHOTIC FEATURES: ICD-10-CM

## 2023-02-27 DIAGNOSIS — F33.2 SEVERE EPISODE OF RECURRENT MAJOR DEPRESSIVE DISORDER, WITHOUT PSYCHOTIC FEATURES: Primary | ICD-10-CM

## 2023-02-27 DIAGNOSIS — D64.9 ANEMIA, UNSPECIFIED TYPE: Primary | ICD-10-CM

## 2023-02-27 DIAGNOSIS — F41.1 GAD (GENERALIZED ANXIETY DISORDER): ICD-10-CM

## 2023-02-27 PROCEDURE — 3078F DIAST BP <80 MM HG: CPT | Mod: CPTII,,,

## 2023-02-27 PROCEDURE — 3078F PR MOST RECENT DIASTOLIC BLOOD PRESSURE < 80 MM HG: ICD-10-PCS | Mod: CPTII,,,

## 2023-02-27 PROCEDURE — 3074F SYST BP LT 130 MM HG: CPT | Mod: CPTII,,, | Performed by: STUDENT IN AN ORGANIZED HEALTH CARE EDUCATION/TRAINING PROGRAM

## 2023-02-27 PROCEDURE — 1160F RVW MEDS BY RX/DR IN RCRD: CPT | Mod: CPTII,,, | Performed by: STUDENT IN AN ORGANIZED HEALTH CARE EDUCATION/TRAINING PROGRAM

## 2023-02-27 PROCEDURE — 99214 OFFICE O/P EST MOD 30 MIN: CPT | Mod: S$PBB,,,

## 2023-02-27 PROCEDURE — 1160F RVW MEDS BY RX/DR IN RCRD: CPT | Mod: CPTII,,,

## 2023-02-27 PROCEDURE — 3078F PR MOST RECENT DIASTOLIC BLOOD PRESSURE < 80 MM HG: ICD-10-PCS | Mod: CPTII,,, | Performed by: STUDENT IN AN ORGANIZED HEALTH CARE EDUCATION/TRAINING PROGRAM

## 2023-02-27 PROCEDURE — 99214 PR OFFICE/OUTPT VISIT, EST, LEVL IV, 30-39 MIN: ICD-10-PCS | Mod: S$PBB,,,

## 2023-02-27 PROCEDURE — 3074F SYST BP LT 130 MM HG: CPT | Mod: CPTII,,,

## 2023-02-27 PROCEDURE — 3078F DIAST BP <80 MM HG: CPT | Mod: CPTII,,, | Performed by: STUDENT IN AN ORGANIZED HEALTH CARE EDUCATION/TRAINING PROGRAM

## 2023-02-27 PROCEDURE — 1160F PR REVIEW ALL MEDS BY PRESCRIBER/CLIN PHARMACIST DOCUMENTED: ICD-10-PCS | Mod: CPTII,,, | Performed by: STUDENT IN AN ORGANIZED HEALTH CARE EDUCATION/TRAINING PROGRAM

## 2023-02-27 PROCEDURE — 99213 OFFICE O/P EST LOW 20 MIN: CPT | Mod: PBBFAC,PN

## 2023-02-27 PROCEDURE — 3008F BODY MASS INDEX DOCD: CPT | Mod: CPTII,,, | Performed by: STUDENT IN AN ORGANIZED HEALTH CARE EDUCATION/TRAINING PROGRAM

## 2023-02-27 PROCEDURE — 3008F PR BODY MASS INDEX (BMI) DOCUMENTED: ICD-10-PCS | Mod: CPTII,,, | Performed by: STUDENT IN AN ORGANIZED HEALTH CARE EDUCATION/TRAINING PROGRAM

## 2023-02-27 PROCEDURE — 3074F PR MOST RECENT SYSTOLIC BLOOD PRESSURE < 130 MM HG: ICD-10-PCS | Mod: CPTII,,, | Performed by: STUDENT IN AN ORGANIZED HEALTH CARE EDUCATION/TRAINING PROGRAM

## 2023-02-27 PROCEDURE — 1159F PR MEDICATION LIST DOCUMENTED IN MEDICAL RECORD: ICD-10-PCS | Mod: CPTII,,, | Performed by: STUDENT IN AN ORGANIZED HEALTH CARE EDUCATION/TRAINING PROGRAM

## 2023-02-27 PROCEDURE — 1159F MED LIST DOCD IN RCRD: CPT | Mod: CPTII,,,

## 2023-02-27 PROCEDURE — 3008F PR BODY MASS INDEX (BMI) DOCUMENTED: ICD-10-PCS | Mod: CPTII,,,

## 2023-02-27 PROCEDURE — 99214 OFFICE O/P EST MOD 30 MIN: CPT | Mod: S$PBB,,, | Performed by: STUDENT IN AN ORGANIZED HEALTH CARE EDUCATION/TRAINING PROGRAM

## 2023-02-27 PROCEDURE — 1159F PR MEDICATION LIST DOCUMENTED IN MEDICAL RECORD: ICD-10-PCS | Mod: CPTII,,,

## 2023-02-27 PROCEDURE — 3074F PR MOST RECENT SYSTOLIC BLOOD PRESSURE < 130 MM HG: ICD-10-PCS | Mod: CPTII,,,

## 2023-02-27 PROCEDURE — 3008F BODY MASS INDEX DOCD: CPT | Mod: CPTII,,,

## 2023-02-27 PROCEDURE — 1159F MED LIST DOCD IN RCRD: CPT | Mod: CPTII,,, | Performed by: STUDENT IN AN ORGANIZED HEALTH CARE EDUCATION/TRAINING PROGRAM

## 2023-02-27 PROCEDURE — 99214 PR OFFICE/OUTPT VISIT, EST, LEVL IV, 30-39 MIN: ICD-10-PCS | Mod: S$PBB,,, | Performed by: STUDENT IN AN ORGANIZED HEALTH CARE EDUCATION/TRAINING PROGRAM

## 2023-02-27 PROCEDURE — 1160F PR REVIEW ALL MEDS BY PRESCRIBER/CLIN PHARMACIST DOCUMENTED: ICD-10-PCS | Mod: CPTII,,,

## 2023-02-27 PROCEDURE — 99213 OFFICE O/P EST LOW 20 MIN: CPT | Mod: PBBFAC,27,PN | Performed by: STUDENT IN AN ORGANIZED HEALTH CARE EDUCATION/TRAINING PROGRAM

## 2023-02-27 RX ORDER — BUSPIRONE HYDROCHLORIDE 10 MG/1
10 TABLET ORAL 3 TIMES DAILY
Qty: 90 TABLET | Refills: 5 | Status: SHIPPED | OUTPATIENT
Start: 2023-02-27 | End: 2023-05-30 | Stop reason: SDUPTHER

## 2023-02-27 RX ORDER — FLUOXETINE HYDROCHLORIDE 20 MG/1
60 CAPSULE ORAL DAILY
Qty: 90 CAPSULE | Refills: 5 | Status: SHIPPED | OUTPATIENT
Start: 2023-02-27 | End: 2023-05-11 | Stop reason: SDUPTHER

## 2023-02-27 NOTE — ASSESSMENT & PLAN NOTE
Patient inquiring about genetic testing for medication for depression. He will ask psychiatrist about this during his upcoming appointment. He states overall he is feeling much better.

## 2023-02-27 NOTE — PROGRESS NOTES
"Outpatient Psychiatry Follow-Up Visit    2/27/2023    Clinical Status of Patient:  Outpatient (Ambulatory)    Chief Complaint:  Channing Lennon is a 32 y.o. male who presents today for follow-up of depression and anxiety. Patient last seen for initial evaluation on 12/19/2022. Met with patient.      Interval History and Content of Current Session:  Interim Events/Subjective Report/Content of Current Session:   Pt reports doing "ok" since last visit.  Notes mood has been "ok."  Notes anxiety remains elevated.  Sleep disrupted by changing sleep schedule.  Appetite good, weight stable.  Energy very low throughout the day.  Motivation low.  +anhedonia.  +hopelessness.  Denies SI/HI/AVH/paranoia, denies plan or desire for self harm or harm to others.  Denies somatic complaints.  Denies medication SE.  Pt taking valium "a few times a week: (mostly 1 to 2 tablets per week).  Pt voices interest in adjusting regimen to address ongoing symptoms.      Psychiatric Review of Systems-is patient experiencing or having changes in  Anxiety: elevated  Sleep: broken  Appetite: good  Weight: stable  Energy: low  Concentration: fair   Libido: good  Irritability: yes  Motivation: low  Guilt/hopelessness: yes  Paranoia/delusions: denies  SIB/risky behavior: denies    Review of Systems   PSYCHIATRIC: Pertinant items are noted in the narrative.  CONSTITUTIONAL: No weight gain or loss.  MUSCULOSKELETAL: No pain or stiffness of the joints.  NEUROLOGIC: No weakness, sensory changes, seizures, confusion, memory loss, tremor or other abnormal movements.  CARDIAC: No CP, no palpitations  RESPIRATORY: No shortness of breath.  CARDIOVASCULAR: No tachycardia or chest pain.  GASTROINTESTINAL: No nausea, vomiting, pain, constipation or diarrhea.    Past Medical, Family and Social History: The patient's past medical, family and social history have been reviewed and updated as appropriate within the electronic medical record - see encounter " "notes.    Compliance: sometimes forgets doses    Side effects: denies    Risk Parameters:  Patient reports no suicidal ideation  Patient reports no homicidal ideation  Patient reports no self-injurious behavior  Patient reports no violent behavior    Exam (detailed: at least 9 elements; comprehensive: all 15 elements)   Constitutional  Vitals:  Most recent vital signs, dated less than 90 days prior to this appointment, were reviewed.     Vitals:    02/27/23 1448   BP: 112/76   Pulse: 80   Temp: 98.6 °F (37 °C)   SpO2: 100%   Weight: 51.3 kg (113 lb 1.6 oz)        General:   Constitutional: No acute distress, appears stated age, casually dressed    Neurologic:   Motor: moves all extremities spontaneously and without difficulty, no abnormal involuntary movements observed  Gait: normal gait and station    Mental status examination:   Appearance: appears stated age, casually dressed, no acute distress  Behavior: unremarkable for situation, calm and cooperative  Mood: "anxious"  Affect: mood congruent and anxious-appearing  Thought process: linear and goal directed  Associations: appropriate for conversation  Thought content: no plan or desire for self harm or harm to others, denies paranoia, no delusional ideation volunteered  Perceptions: denies hallucinations or other altered perceptions  Orientation: oriented to day of week, month, year, location, and situation  Language: English, fluid  Attention: able to attend to interview  Insight: good  Judgement: good    PHQ9 2/27/2023   Total Score 22     GAD7 2/27/2023 12/19/2022   1. Feeling nervous, anxious, or on edge? 3 3   2. Not being able to stop or control worrying? 3 3   3. Worrying too much about different things? 3 3   4. Trouble relaxing? 2 1   5. Being so restless that it is hard to sit still? 0 2   6. Becoming easily annoyed or irritable? 1 2   7. Feeling afraid as if something awful might happen? 3 3   8. If you checked off any problems, how difficult have " these problems made it for you to do your work, take care of things at home, or get along with other people? 2 2   PERNELL-7 Score 15 17     Assessment and Diagnosis   Status/Progress: Based on the examination today, the patient's problem(s) is/are adequately but not ideally controlled.  New problems have not been presented today.   Co-morbidities and Lack of compliance are not complicating management of the primary condition.       General Impression:    ICD-10-CM ICD-9-CM   1. Severe episode of recurrent major depressive disorder, without psychotic features  F33.2 296.33   2. PERNELL (generalized anxiety disorder)  F41.1 300.02     Intervention/Counseling/Treatment Plan   Decreased zoloft to 50mg daily x14 days, then stop  Increase prozac to 60mg daily  Will plan to gradually start buspirone after pt's SSRI cross-titration is completed  Continue PRN valium, PDMP reviewed and demonstrated no abnormal filling patterns.   Recommend pt continue with current counselor  Recent labwork in EMR reviewed, CMP wnl, CBC w/ borderline low MCV, TSH wnl  UDS at initial visit +cannabis and +benzo  No need for PEC as pt is not an imminent danger to self or others or gravely disabled due to acute psychiatric illness  Discussed that pt should either call clinic for psychiatric crisis symptoms or present to nearest emergency room    Discussed with patient informed consent including diagnosis, risks and benefits of proposed treatment above vs. alternative treatments vs. no treatment, as well as serious and common side effects of these treatments, and the inherent unpredictability of individual responses to these treatments. The patient expresses understanding of the above and displays the capacity to agree with this current plan. Patient also agrees that, currently, the benefits outweigh the risks and would like to pursue treatment at this time, and had no other questions.    Instructions:  Take all medications as prescribed.    Abstain from  recreational drugs and alcohol.  Present to ED or call 911 for SI/HI plan or intent, psychosis, or medical emergency.    Return to Clinic: Follow up in about 3 months (around 5/27/2023).    Total time:   Complexity (level) of medical decision making employed in the encounter: MODERATE    The total time for services performed on the date of the encounter: 30 minutes    Chad Youssef MD  [facility]

## 2023-02-27 NOTE — ASSESSMENT & PLAN NOTE
Patient has been taking iron supplement OTC  Add iron rich foods to diet such as liver, lean beef, eggs, dried fruit, dark green leafy vegetables.  Do NOT drink milk or take antacids at the same time you take your iron supplement.  Iron supplements can cause constipation; add stool softener or fiber as needed.      Labs as ordered today

## 2023-02-27 NOTE — PROGRESS NOTES
Patient Name: Channing Lennon   : 1990  MRN: [id number]     Subjective:   Patient ID: Channing Lennon is a 32 y.o. male.    Chief Complaint:   Chief Complaint   Patient presents with    Follow-up    Sinus Problem        HPI: 2023: patient here today for routine follow up of anxiety, depression (managed now by psych) and anemia. States he has been feeling much better recently and is in town for a few weeks to visit family. Denies SI/HI or episodes of sergey. Is inquiring about genetic testing to ensure he is on correct antidepressant. Currently working with psychotherapist. Has also been taking daily OTC iron supplement but denies any improvement to fatigue.     10/26/2022:  Today to review labs with patient, all questions answered to patient's understanding.  Patient did state that he has been anemic previously on blood work but there was no known cause or issues.  No pertinent family history correlated to this.  Will recheck this level again in 3 months face-to-face to ensure that this is a stable level.  Patient missed his appointment with psychiatrist to establish care and have medication management, he has reschedule this for December nose requesting refills of his antidepressants until this appointment.    10/11/2022:  Patient presents to clinic today to establish care.  He recently lost private insurance and has been having to find new providers that except his Medicaid.  He has history of anxiety and depression which is controlled with Zoloft and Valium p.r.n..  At length discussed with patient how that is not medicine that be filled in primary care setting and we can set him up with a different psychiatrist for formal evaluation and treatment.  Patient is open to this plan.  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  Unsure of any previous health maintenance the patient denies any acute  "complaints or family histories      ROS:  Review of Systems   Constitutional:  Positive for malaise/fatigue. Negative for chills, fever and weight loss.   HENT:  Negative for ear discharge, nosebleeds and tinnitus.    Eyes:  Negative for blurred vision, photophobia and pain.   Respiratory:  Negative for cough, shortness of breath, wheezing and stridor.    Cardiovascular:  Negative for chest pain, palpitations and orthopnea.   Gastrointestinal:  Negative for abdominal pain, heartburn and nausea.   Genitourinary:  Negative for dysuria, frequency, hematuria and urgency.   Musculoskeletal:  Negative for falls and myalgias.   Skin:  Negative for itching and rash.   Neurological:  Negative for dizziness, sensory change, speech change, focal weakness, seizures, weakness and headaches.   Endo/Heme/Allergies:  Negative for environmental allergies. Does not bruise/bleed easily.   Psychiatric/Behavioral:  Negative for hallucinations and suicidal ideas.     History:   History reviewed. No pertinent past medical history.   History reviewed. No pertinent surgical history.  History reviewed. No pertinent family history.   Social History     Tobacco Use    Smoking status: Never    Smokeless tobacco: Never   Substance and Sexual Activity    Alcohol use: Yes     Alcohol/week: 1.0 standard drink     Types: 1 Drinks containing 0.5 oz of alcohol per week    Drug use: Yes     Types: Marijuana     Comment: at night    Sexual activity: Not Currently        Allergies: Review of patient's allergies indicates:  No Known Allergies  Objective:     Vitals:    02/27/23 1012   BP: 110/71   Pulse: 83   Resp: 18   Temp: 97.7 °F (36.5 °C)   SpO2: 95%   Weight: 51.9 kg (114 lb 8 oz)   Height: 5' 2" (1.575 m)     Body mass index is 20.94 kg/m².     Physical Examination:   Physical Exam  Constitutional:       General: He is not in acute distress.     Appearance: Normal appearance. He is not ill-appearing.   Cardiovascular:      Rate and Rhythm: Normal " rate and regular rhythm.      Heart sounds: Normal heart sounds.   Pulmonary:      Effort: Pulmonary effort is normal. No respiratory distress.      Breath sounds: Normal breath sounds.   Musculoskeletal:      Cervical back: Normal range of motion.   Skin:     General: Skin is warm and dry.   Neurological:      Mental Status: He is alert and oriented to person, place, and time.   Psychiatric:         Mood and Affect: Mood normal.         Behavior: Behavior normal.       Assessment:     Problem List Items Addressed This Visit          Psychiatric    Severe episode of recurrent major depressive disorder    Current Assessment & Plan     Patient inquiring about genetic testing for medication for depression. He will ask psychiatrist about this during his upcoming appointment. He states overall he is feeling much better.             Oncology    Anemia - Primary    Current Assessment & Plan     Patient has been taking iron supplement OTC  Add iron rich foods to diet such as liver, lean beef, eggs, dried fruit, dark green leafy vegetables.  Do NOT drink milk or take antacids at the same time you take your iron supplement.  Iron supplements can cause constipation; add stool softener or fiber as needed.      Labs as ordered today          Relevant Orders    Ferritin    Iron and TIBC    Vitamin B12    Folate    CBC Auto Differential       Plan:   Channing was seen today for follow-up and sinus problem.    Diagnoses and all orders for this visit:    Anemia, unspecified type  -     Ferritin  -     Iron and TIBC  -     Vitamin B12  -     Folate  -     CBC Auto Differential    Severe episode of recurrent major depressive disorder, without psychotic features       Follow up in about 6 months (around 8/27/2023) for annual.     This note was created with the assistance of Dragon voice recognition software or phone dictation. There may be transcription errors as a result of using this technology however minimal. Effort has been made to  assure accuracy of transcription but any obvious errors or omissions should be clarified with the author of the document    I spent a total of 30 minutes on the day of the visit.This includes face to face time and non-face to face time preparing to see the patient (eg, review of tests), obtaining and/or reviewing separately obtained history, documenting clinical information in the electronic or other health record, independently interpreting results and communicating results to the patient/family/caregiver, or care coordinator.

## 2023-03-02 ENCOUNTER — CLINICAL SUPPORT (OUTPATIENT)
Dept: FAMILY MEDICINE | Facility: CLINIC | Age: 33
End: 2023-03-02
Payer: COMMERCIAL

## 2023-03-02 LAB
BASOPHILS # BLD AUTO: 0.03 X10(3)/MCL (ref 0–0.2)
BASOPHILS NFR BLD AUTO: 0.6 %
EOSINOPHIL # BLD AUTO: 0.08 X10(3)/MCL (ref 0–0.9)
EOSINOPHIL NFR BLD AUTO: 1.7 %
ERYTHROCYTE [DISTWIDTH] IN BLOOD BY AUTOMATED COUNT: 12.9 % (ref 11.5–17)
FERRITIN SERPL-MCNC: 88.04 NG/ML (ref 21.81–274.66)
FOLATE SERPL-MCNC: 9.6 NG/ML (ref 7–31.4)
HCT VFR BLD AUTO: 39.5 % (ref 42–52)
HGB BLD-MCNC: 13.1 G/DL (ref 14–18)
IMM GRANULOCYTES # BLD AUTO: 0.01 X10(3)/MCL (ref 0–0.04)
IMM GRANULOCYTES NFR BLD AUTO: 0.2 %
IRON SATN MFR SERPL: 18 % (ref 20–50)
IRON SERPL-MCNC: 47 UG/DL (ref 65–175)
LYMPHOCYTES # BLD AUTO: 1.42 X10(3)/MCL (ref 0.6–4.6)
LYMPHOCYTES NFR BLD AUTO: 30.5 %
MCH RBC QN AUTO: 28.2 PG
MCHC RBC AUTO-ENTMCNC: 33.2 G/DL (ref 33–36)
MCV RBC AUTO: 85.1 FL (ref 80–94)
MONOCYTES # BLD AUTO: 0.69 X10(3)/MCL (ref 0.1–1.3)
MONOCYTES NFR BLD AUTO: 14.8 %
NEUTROPHILS # BLD AUTO: 2.43 X10(3)/MCL (ref 2.1–9.2)
NEUTROPHILS NFR BLD AUTO: 52.2 %
NRBC BLD AUTO-RTO: 0 %
PLATELET # BLD AUTO: 161 X10(3)/MCL (ref 130–400)
PMV BLD AUTO: 10 FL (ref 7.4–10.4)
RBC # BLD AUTO: 4.64 X10(6)/MCL (ref 4.7–6.1)
TIBC SERPL-MCNC: 208 UG/DL (ref 69–240)
TIBC SERPL-MCNC: 255 UG/DL (ref 250–450)
TRANSFERRIN SERPL-MCNC: 221 MG/DL (ref 174–364)
VIT B12 SERPL-MCNC: 867 PG/ML (ref 213–816)
WBC # SPEC AUTO: 4.7 X10(3)/MCL (ref 4.5–11.5)

## 2023-03-02 PROCEDURE — 99211 OFF/OP EST MAY X REQ PHY/QHP: CPT | Mod: PBBFAC,PN

## 2023-03-08 ENCOUNTER — PATIENT MESSAGE (OUTPATIENT)
Dept: FAMILY MEDICINE | Facility: CLINIC | Age: 33
End: 2023-03-08
Payer: MEDICAID

## 2023-03-08 DIAGNOSIS — M79.673 HEEL PAIN, UNSPECIFIED LATERALITY: Primary | ICD-10-CM

## 2023-03-09 ENCOUNTER — PATIENT MESSAGE (OUTPATIENT)
Dept: FAMILY MEDICINE | Facility: CLINIC | Age: 33
End: 2023-03-09
Payer: MEDICAID

## 2023-03-13 ENCOUNTER — PATIENT MESSAGE (OUTPATIENT)
Dept: BEHAVIORAL HEALTH | Facility: CLINIC | Age: 33
End: 2023-03-13
Payer: MEDICAID

## 2023-03-14 DIAGNOSIS — F33.2 SEVERE EPISODE OF RECURRENT MAJOR DEPRESSIVE DISORDER, WITHOUT PSYCHOTIC FEATURES: Primary | ICD-10-CM

## 2023-03-14 DIAGNOSIS — F41.1 GAD (GENERALIZED ANXIETY DISORDER): ICD-10-CM

## 2023-03-14 RX ORDER — SERTRALINE HYDROCHLORIDE 50 MG/1
50 TABLET, FILM COATED ORAL DAILY
Qty: 14 TABLET | Refills: 0 | Status: SHIPPED | OUTPATIENT
Start: 2023-03-14 | End: 2023-05-30 | Stop reason: ALTCHOICE

## 2023-05-11 ENCOUNTER — TELEPHONE (OUTPATIENT)
Dept: FAMILY MEDICINE | Facility: CLINIC | Age: 33
End: 2023-05-11
Payer: MEDICAID

## 2023-05-11 DIAGNOSIS — F33.2 SEVERE EPISODE OF RECURRENT MAJOR DEPRESSIVE DISORDER, WITHOUT PSYCHOTIC FEATURES: ICD-10-CM

## 2023-05-11 DIAGNOSIS — F41.1 GAD (GENERALIZED ANXIETY DISORDER): ICD-10-CM

## 2023-05-11 RX ORDER — FLUOXETINE HYDROCHLORIDE 20 MG/1
60 CAPSULE ORAL DAILY
Qty: 90 CAPSULE | Refills: 5 | Status: SHIPPED | OUTPATIENT
Start: 2023-05-11 | End: 2023-05-30 | Stop reason: SDUPTHER

## 2023-05-30 ENCOUNTER — OFFICE VISIT (OUTPATIENT)
Dept: BEHAVIORAL HEALTH | Facility: CLINIC | Age: 33
End: 2023-05-30
Payer: COMMERCIAL

## 2023-05-30 VITALS
DIASTOLIC BLOOD PRESSURE: 78 MMHG | WEIGHT: 109.19 LBS | OXYGEN SATURATION: 100 % | TEMPERATURE: 98 F | HEART RATE: 80 BPM | SYSTOLIC BLOOD PRESSURE: 122 MMHG | BODY MASS INDEX: 19.97 KG/M2

## 2023-05-30 DIAGNOSIS — F33.41 RECURRENT MAJOR DEPRESSIVE DISORDER, IN PARTIAL REMISSION: Primary | ICD-10-CM

## 2023-05-30 DIAGNOSIS — F33.2 SEVERE EPISODE OF RECURRENT MAJOR DEPRESSIVE DISORDER, WITHOUT PSYCHOTIC FEATURES: ICD-10-CM

## 2023-05-30 DIAGNOSIS — F41.1 GAD (GENERALIZED ANXIETY DISORDER): ICD-10-CM

## 2023-05-30 PROCEDURE — 1159F PR MEDICATION LIST DOCUMENTED IN MEDICAL RECORD: ICD-10-PCS | Mod: CPTII,,, | Performed by: STUDENT IN AN ORGANIZED HEALTH CARE EDUCATION/TRAINING PROGRAM

## 2023-05-30 PROCEDURE — 3074F PR MOST RECENT SYSTOLIC BLOOD PRESSURE < 130 MM HG: ICD-10-PCS | Mod: CPTII,,, | Performed by: STUDENT IN AN ORGANIZED HEALTH CARE EDUCATION/TRAINING PROGRAM

## 2023-05-30 PROCEDURE — 99213 OFFICE O/P EST LOW 20 MIN: CPT | Mod: PBBFAC,PN | Performed by: STUDENT IN AN ORGANIZED HEALTH CARE EDUCATION/TRAINING PROGRAM

## 2023-05-30 PROCEDURE — 1159F MED LIST DOCD IN RCRD: CPT | Mod: CPTII,,, | Performed by: STUDENT IN AN ORGANIZED HEALTH CARE EDUCATION/TRAINING PROGRAM

## 2023-05-30 PROCEDURE — 99214 PR OFFICE/OUTPT VISIT, EST, LEVL IV, 30-39 MIN: ICD-10-PCS | Mod: S$PBB,,, | Performed by: STUDENT IN AN ORGANIZED HEALTH CARE EDUCATION/TRAINING PROGRAM

## 2023-05-30 PROCEDURE — 1160F RVW MEDS BY RX/DR IN RCRD: CPT | Mod: CPTII,,, | Performed by: STUDENT IN AN ORGANIZED HEALTH CARE EDUCATION/TRAINING PROGRAM

## 2023-05-30 PROCEDURE — 3074F SYST BP LT 130 MM HG: CPT | Mod: CPTII,,, | Performed by: STUDENT IN AN ORGANIZED HEALTH CARE EDUCATION/TRAINING PROGRAM

## 2023-05-30 PROCEDURE — 3078F PR MOST RECENT DIASTOLIC BLOOD PRESSURE < 80 MM HG: ICD-10-PCS | Mod: CPTII,,, | Performed by: STUDENT IN AN ORGANIZED HEALTH CARE EDUCATION/TRAINING PROGRAM

## 2023-05-30 PROCEDURE — 3078F DIAST BP <80 MM HG: CPT | Mod: CPTII,,, | Performed by: STUDENT IN AN ORGANIZED HEALTH CARE EDUCATION/TRAINING PROGRAM

## 2023-05-30 PROCEDURE — 3008F PR BODY MASS INDEX (BMI) DOCUMENTED: ICD-10-PCS | Mod: CPTII,,, | Performed by: STUDENT IN AN ORGANIZED HEALTH CARE EDUCATION/TRAINING PROGRAM

## 2023-05-30 PROCEDURE — 1160F PR REVIEW ALL MEDS BY PRESCRIBER/CLIN PHARMACIST DOCUMENTED: ICD-10-PCS | Mod: CPTII,,, | Performed by: STUDENT IN AN ORGANIZED HEALTH CARE EDUCATION/TRAINING PROGRAM

## 2023-05-30 PROCEDURE — 99214 OFFICE O/P EST MOD 30 MIN: CPT | Mod: S$PBB,,, | Performed by: STUDENT IN AN ORGANIZED HEALTH CARE EDUCATION/TRAINING PROGRAM

## 2023-05-30 PROCEDURE — 3008F BODY MASS INDEX DOCD: CPT | Mod: CPTII,,, | Performed by: STUDENT IN AN ORGANIZED HEALTH CARE EDUCATION/TRAINING PROGRAM

## 2023-05-30 RX ORDER — DIAZEPAM 10 MG/1
10 TABLET ORAL DAILY PRN
Qty: 30 TABLET | Refills: 1 | Status: SHIPPED | OUTPATIENT
Start: 2023-05-30 | End: 2023-12-18 | Stop reason: SDUPTHER

## 2023-05-30 RX ORDER — FLUOXETINE HYDROCHLORIDE 40 MG/1
80 CAPSULE ORAL DAILY
Qty: 180 CAPSULE | Refills: 1 | Status: SHIPPED | OUTPATIENT
Start: 2023-05-30 | End: 2023-08-30 | Stop reason: SDUPTHER

## 2023-05-30 RX ORDER — FLUOXETINE HYDROCHLORIDE 40 MG/1
80 CAPSULE ORAL DAILY
Qty: 60 CAPSULE | Refills: 5 | Status: SHIPPED | OUTPATIENT
Start: 2023-05-30 | End: 2023-05-30 | Stop reason: SDUPTHER

## 2023-05-30 RX ORDER — BUSPIRONE HYDROCHLORIDE 10 MG/1
10 TABLET ORAL 3 TIMES DAILY
Qty: 270 TABLET | Refills: 1 | Status: SHIPPED | OUTPATIENT
Start: 2023-05-30 | End: 2023-08-30 | Stop reason: SDUPTHER

## 2023-05-30 NOTE — PROGRESS NOTES
"Outpatient Psychiatry Follow-Up Visit    5/30/2023    Clinical Status of Patient:  Outpatient (Ambulatory)    Chief Complaint:  Channing Lennon is a 32 y.o. male who presents today for follow-up of depression and anxiety. Patient last seen for follow-up on 2/27/2023. Met with patient.      Interval History and Content of Current Session:  Interim Events/Subjective Report/Content of Current Session:   Pt reports doing "ok"  overall.  Notes period of depression x4-6  weeks, improving now.  Notes he was feeling hopeless, crying spells.  Notes trigger for mood symptoms was issue with romantic relationshi.  Reports "a little low" mood, currently feeling depressed.  Alright anxiety.  Sleep at baseline, 10 hrs nightly. Appetite at baseline, weight stable.  Energy fair, motivation low.  Endorses irritability, occasional hopelessness.  Denies SI/HI/AVH/paranoia, denies plan or desire for self harm or harm to others.  Denies SE from current regimen. Denies somatic complaints.   Pt voices desire to adjust regimen to address ongoing symptoms.      Psychiatric Review of Systems-is patient experiencing or having changes in  Anxiety: elevated  Sleep: at baseline  Appetite: good  Weight: stable  Energy: low  Concentration: fair   Libido: good  Irritability: yes  Motivation: low  Guilt/hopelessness: yes  Paranoia/delusions: denies  SIB/risky behavior: denies    Review of Systems   PSYCHIATRIC: Pertinant items are noted in the narrative.  CONSTITUTIONAL: No weight gain or loss.  MUSCULOSKELETAL: No pain or stiffness of the joints.  NEUROLOGIC: No weakness, sensory changes, seizures, confusion, memory loss, tremor or other abnormal movements.  CARDIAC: No CP, no palpitations  RESPIRATORY: No shortness of breath.  CARDIOVASCULAR: No tachycardia or chest pain.  GASTROINTESTINAL: No nausea, vomiting, pain, constipation or diarrhea.    Past Medical, Family and Social History: The patient's past medical, family and social history have been " "reviewed and updated as appropriate within the electronic medical record - see encounter notes.    Compliance: sometimes forgets doses    Side effects: denies    Risk Parameters:  Patient reports no suicidal ideation  Patient reports no homicidal ideation  Patient reports no self-injurious behavior  Patient reports no violent behavior    Exam (detailed: at least 9 elements; comprehensive: all 15 elements)   Constitutional  Vitals:  Most recent vital signs, dated less than 90 days prior to this appointment, were reviewed.     Vitals:    05/30/23 1114   BP: 122/78   Pulse: 80   Temp: 98.2 °F (36.8 °C)   SpO2: 100%   Weight: 49.5 kg (109 lb 3.2 oz)        General:   Constitutional: No acute distress, appears stated age, casually dressed    Neurologic:   Motor: moves all extremities spontaneously and without difficulty, no abnormal involuntary movements observed  Gait: normal gait and station    Mental status examination:   Appearance: appears stated age, casually dressed, no acute distress  Behavior: unremarkable for situation, calm and cooperative  Mood: "alright"  Affect: mood congruent, constricted and anxious-appearing  Thought process: linear and goal directed  Associations: appropriate for conversation  Thought content: no plan or desire for self harm or harm to others, denies paranoia, no delusional ideation volunteered  Perceptions: denies hallucinations or other altered perceptions  Orientation: oriented to day of week, month, year, location, and situation  Language: English, fluid  Attention: able to attend to interview  Insight: good  Judgement: good    PHQ9 5/30/2023   Total Score 18     GAD7 5/30/2023 2/27/2023 12/19/2022   1. Feeling nervous, anxious, or on edge? 1 3 3   2. Not being able to stop or control worrying? 3 3 3   3. Worrying too much about different things? 3 3 3   4. Trouble relaxing? 2 2 1   5. Being so restless that it is hard to sit still? 3 0 2   6. Becoming easily annoyed or irritable? 2 " 1 2   7. Feeling afraid as if something awful might happen? 3 3 3   8. If you checked off any problems, how difficult have these problems made it for you to do your work, take care of things at home, or get along with other people? 2 2 2   PERNELL-7 Score 17 15 17     Assessment and Diagnosis   Status/Progress: Based on the examination today, the patient's problem(s) is/are adequately but not ideally controlled.  New problems have not been presented today.   Co-morbidities and Lack of compliance are not complicating management of the primary condition.  Number of separate conditions addressed during today's visit: 2 (depression improving, anxiety stable).  Are medication adjustments being made today: Yes.  Are referral(s) being ordered today: No.  Complexity (level) of medical decision making employed in the encounter: MODERATE.    General Impression:    ICD-10-CM ICD-9-CM   1. Recurrent major depressive disorder, in partial remission  F33.41 296.35   2. PERNELL (generalized anxiety disorder)  F41.1 300.02   3. Severe episode of recurrent major depressive disorder, without psychotic features  F33.2 296.33     Intervention/Counseling/Treatment Plan   Increase prozac to 80mg daily, consider augmenting with remeron  Discontinue sertraline   Continue buspirone 10mg tid  Continue PRN valium, PDMP reviewed and demonstrated no abnormal filling patterns.   Recommend pt continue with current counselor  Recent labwork in EMR reviewed, CMP wnl, CBC w/ borderline low MCV, TSH wnl  UDS at initial visit +cannabis and +benzo  No need for PEC as pt is not an imminent danger to self or others or gravely disabled due to acute psychiatric illness  Discussed that pt should either call clinic for psychiatric crisis symptoms or present to nearest emergency room    Discussed with patient informed consent including diagnosis, risks and benefits of proposed treatment above vs. alternative treatments vs. no treatment, as well as serious and common  side effects of these treatments, and the inherent unpredictability of individual responses to these treatments. The patient expresses understanding of the above and displays the capacity to agree with this current plan. Patient also agrees that, currently, the benefits outweigh the risks and would like to pursue treatment at this time, and had no other questions.    Instructions:  Take all medications as prescribed.    Abstain from recreational drugs and alcohol.  Present to ED or call 911 for SI/HI plan or intent, psychosis, or medical emergency.    Return to Clinic: Follow up in about 2 months (around 7/30/2023).    Total time:   Complexity (level) of medical decision making employed in the encounter: MODERATE    The total time for services performed on the date of the encounter: 34 minutes    Chad Youssef MD  [facility]

## 2023-06-06 ENCOUNTER — PATIENT MESSAGE (OUTPATIENT)
Dept: BEHAVIORAL HEALTH | Facility: CLINIC | Age: 33
End: 2023-06-06
Payer: MEDICAID

## 2023-06-06 ENCOUNTER — OFFICE VISIT (OUTPATIENT)
Dept: FAMILY MEDICINE | Facility: CLINIC | Age: 33
End: 2023-06-06
Payer: MEDICAID

## 2023-06-06 DIAGNOSIS — R51.9 ACUTE NONINTRACTABLE HEADACHE, UNSPECIFIED HEADACHE TYPE: Primary | ICD-10-CM

## 2023-06-06 DIAGNOSIS — K64.4 SKIN TAG OF ANUS: ICD-10-CM

## 2023-06-06 PROCEDURE — 1159F PR MEDICATION LIST DOCUMENTED IN MEDICAL RECORD: ICD-10-PCS | Mod: CPTII,95,,

## 2023-06-06 PROCEDURE — 1159F MED LIST DOCD IN RCRD: CPT | Mod: CPTII,95,,

## 2023-06-06 PROCEDURE — 1160F RVW MEDS BY RX/DR IN RCRD: CPT | Mod: CPTII,95,,

## 2023-06-06 PROCEDURE — 99214 OFFICE O/P EST MOD 30 MIN: CPT | Mod: 95,,,

## 2023-06-06 PROCEDURE — 1160F PR REVIEW ALL MEDS BY PRESCRIBER/CLIN PHARMACIST DOCUMENTED: ICD-10-PCS | Mod: CPTII,95,,

## 2023-06-06 PROCEDURE — 99214 PR OFFICE/OUTPT VISIT, EST, LEVL IV, 30-39 MIN: ICD-10-PCS | Mod: 95,,,

## 2023-06-06 RX ORDER — BUTALBITAL, ASPIRIN, AND CAFFEINE 325; 50; 40 MG/1; MG/1; MG/1
1 CAPSULE ORAL EVERY 6 HOURS PRN
Qty: 60 CAPSULE | Refills: 0 | Status: SHIPPED | OUTPATIENT
Start: 2023-06-06 | End: 2023-07-06

## 2023-06-06 NOTE — ASSESSMENT & PLAN NOTE
Continue present treatment and plan.  Lie in darkened room and apply cold packs as needed for pain.  Episodic therapy: fiorinal due to low frequency of pain.  Asked to keep headache diary.  Avoid smoking, stressful situations as much as possible, and the use of illicit or street drugs.

## 2023-06-06 NOTE — PROGRESS NOTES
Audio/visual Telehealth Visit     The patient location is:  [place]  The chief complaint leading to consultation is: headaches  Visit type:  Synchronous audio visual  Total time spent with patient: 15 min     Each patient to whom I provide medical services by telemedicine is:  (1) informed of the relationship between the physician and patient and the respective role of any other health care provider with respect to management of the patient; and (2) notified that they may decline to receive medical services by telemedicine and may withdraw from such care at any time. Patient verbally consented to receive this service via audio/visual call.      Patient Name: Channing Lennon   : 1990  MRN: [id number]     Subjective:   Patient ID: Channing Lennon is a 32 y.o. male.    Chief Complaint: Headaches     HPI: 2023:  Self scheduled virtual visit today with complaint of mixed tension and migraine headaches. Home treatment has included Tylenol with codeine and OTC ibuprofen with some improvement. Headaches are occurring once per week. Generally, the headaches last about several hours. Work attendance or other daily activities are affected by the headaches.  Patient also states that he recently stopped Zoloft and increase Prozac as ordered by his psychiatrist, is not sure if that is what is causing his headaches.  The patient denies dizziness, loss of balance, muscle weakness, numbness of extremities, speech difficulties, vision problems, and vomiting in the early morning.    Patient also requesting referral for removal of skin tags around anus.  Denies any known STI infections or genital warts.  States they are very irritating and notices the most when he is having about    2023: patient here today for routine follow up of anxiety, depression (managed now by psych) and anemia. States he has been feeling much better recently and is in town for a few weeks to visit family. Denies SI/HI or episodes of sergey. Is  inquiring about genetic testing to ensure he is on correct antidepressant. Currently working with psychotherapist. Has also been taking daily OTC iron supplement but denies any improvement to fatigue.     10/26/2022:  Today to review labs with patient, all questions answered to patient's understanding.  Patient did state that he has been anemic previously on blood work but there was no known cause or issues.  No pertinent family history correlated to this.  Will recheck this level again in 3 months face-to-face to ensure that this is a stable level.  Patient missed his appointment with psychiatrist to establish care and have medication management, he has reschedule this for December nose requesting refills of his antidepressants until this appointment.    10/11/2022:  Patient presents to clinic today to establish care.  He recently lost private insurance and has been having to find new providers that except his Medicaid.  He has history of anxiety and depression which is controlled with Zoloft and Valium p.r.n..  At length discussed with patient how that is not medicine that be filled in primary care setting and we can set him up with a different psychiatrist for formal evaluation and treatment.  Patient is open to this plan.  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  Unsure of any previous health maintenance the patient denies any acute complaints or family histories      ROS:  Review of Systems   Constitutional:  Negative for chills, fever and weight loss.   HENT:  Negative for ear discharge, nosebleeds and tinnitus.    Eyes:  Negative for blurred vision, photophobia and pain.   Respiratory:  Negative for cough, shortness of breath, wheezing and stridor.    Cardiovascular:  Negative for chest pain, palpitations and orthopnea.   Gastrointestinal:  Negative for abdominal pain, heartburn and nausea.    Genitourinary:  Negative for dysuria, frequency, hematuria and urgency.   Musculoskeletal:  Negative for falls and myalgias.   Skin:  Negative for itching and rash.        Skin tags   Neurological:  Positive for headaches. Negative for dizziness, sensory change, speech change, focal weakness, seizures and weakness.   Endo/Heme/Allergies:  Negative for environmental allergies. Does not bruise/bleed easily.   Psychiatric/Behavioral:  Negative for hallucinations and suicidal ideas.       History:   History reviewed. No pertinent past medical history.   History reviewed. No pertinent surgical history.  History reviewed. No pertinent family history.   Social History     Tobacco Use    Smoking status: Never    Smokeless tobacco: Never   Substance and Sexual Activity    Alcohol use: Yes     Alcohol/week: 1.0 standard drink     Types: 1 Drinks containing 0.5 oz of alcohol per week    Drug use: Yes     Types: Marijuana     Comment: at night    Sexual activity: Not Currently        Allergies: Review of patient's allergies indicates:  No Known Allergies  Objective:   There were no vitals filed for this visit.  There is no height or weight on file to calculate BMI.     Physical Examination:   Physical Exam  Constitutional:       General: He is not in acute distress.     Comments: Limited Physical Exam due to telemedicine visit   Pulmonary:      Effort: Pulmonary effort is normal. No respiratory distress.   Neurological:      Mental Status: He is alert.   Psychiatric:         Mood and Affect: Mood normal.         Behavior: Behavior normal.       Assessment:     Problem List Items Addressed This Visit          Neuro    Acute nonintractable headache - Primary    Current Assessment & Plan     Continue present treatment and plan.  Lie in darkened room and apply cold packs as needed for pain.  Episodic therapy: fiorinal due to low frequency of pain.  Asked to keep headache diary.  Avoid smoking, stressful situations as much as  possible, and the use of illicit or street drugs.             Relevant Medications    butalbital-aspirin-caffeine -40 mg (FIORINAL) -40 mg Cap     Other Visit Diagnoses       Skin tag of anus        Relevant Orders    Ambulatory referral/consult to Family Practice            Plan:   Diagnoses and all orders for this visit:    Acute nonintractable headache, unspecified headache type  -     butalbital-aspirin-caffeine -40 mg (FIORINAL) -40 mg Cap; Take 1 capsule by mouth every 6 (six) hours as needed (headache).    Skin tag of anus  -     Ambulatory referral/consult to Family Practice; Future       Follow up in about 2 weeks (around 6/20/2023) for med change fu, Virtual Visit (HA assessment).       This note was created with the assistance of a voice recognition software or phone dictation. There may be transcription errors as a result of using this technology however minimal. Effort has been made to assure accuracy of transcription but any obvious errors or omissions should be clarified with the author of the document      This service was not originating from a related E/M service provided within the previous 7 days nor will  to an E/M service or procedure within the next 24 hours or my soonest available appointment.  Prevailing standard of care was able to be met in this audio-video visit.

## 2023-06-08 ENCOUNTER — TELEPHONE (OUTPATIENT)
Dept: FAMILY MEDICINE | Facility: CLINIC | Age: 33
End: 2023-06-08
Payer: MEDICAID

## 2023-06-08 DIAGNOSIS — K64.4 SKIN TAG OF ANUS: Primary | ICD-10-CM

## 2023-06-08 NOTE — TELEPHONE ENCOUNTER
"----- Message from Cristal Garduno sent at 6/8/2023  1:04 PM CDT -----  Regarding: RE: Surgery referral  That would be better actually, so it can get to the right people for scheduling, it just needs to go to "[facility]". Thanks.  ----- Message -----  From: Marina Parry NP  Sent: 6/8/2023  11:09 AM CDT  To: Cristal Garduno  Subject: RE: Surgery referral                             Ok do I need to put in a different referral? Thank you for the update!!    ----- Message -----  From: Cristal Garduno  Sent: 6/8/2023   9:38 AM CDT  To: Marina Parry NP  Subject: Surgery referral                                 Hi, regarding the referral you had sent to [facility] for this patient, per /, he will be need to be seen in the general surgery clinic. I edited the referral to reflect so, but I was just letting you know. Thanks!        "

## 2023-06-20 ENCOUNTER — PATIENT MESSAGE (OUTPATIENT)
Dept: FAMILY MEDICINE | Facility: CLINIC | Age: 33
End: 2023-06-20

## 2023-06-20 ENCOUNTER — OFFICE VISIT (OUTPATIENT)
Dept: FAMILY MEDICINE | Facility: CLINIC | Age: 33
End: 2023-06-20
Payer: COMMERCIAL

## 2023-06-20 DIAGNOSIS — R03.0 ELEVATED BP WITHOUT DIAGNOSIS OF HYPERTENSION: Primary | ICD-10-CM

## 2023-06-20 DIAGNOSIS — R51.9 ACUTE NONINTRACTABLE HEADACHE, UNSPECIFIED HEADACHE TYPE: ICD-10-CM

## 2023-06-20 PROCEDURE — 99214 PR OFFICE/OUTPT VISIT, EST, LEVL IV, 30-39 MIN: ICD-10-PCS | Mod: 95,,,

## 2023-06-20 PROCEDURE — 1159F PR MEDICATION LIST DOCUMENTED IN MEDICAL RECORD: ICD-10-PCS | Mod: CPTII,95,,

## 2023-06-20 PROCEDURE — 1160F PR REVIEW ALL MEDS BY PRESCRIBER/CLIN PHARMACIST DOCUMENTED: ICD-10-PCS | Mod: CPTII,95,,

## 2023-06-20 PROCEDURE — 1160F RVW MEDS BY RX/DR IN RCRD: CPT | Mod: CPTII,95,,

## 2023-06-20 PROCEDURE — 99214 OFFICE O/P EST MOD 30 MIN: CPT | Mod: 95,,,

## 2023-06-20 PROCEDURE — 1159F MED LIST DOCD IN RCRD: CPT | Mod: CPTII,95,,

## 2023-06-20 RX ORDER — CLONIDINE HYDROCHLORIDE 0.1 MG/1
0.1 TABLET ORAL 2 TIMES DAILY PRN
Qty: 60 TABLET | Refills: 2 | Status: SHIPPED | OUTPATIENT
Start: 2023-06-20 | End: 2023-12-18

## 2023-06-20 NOTE — PROGRESS NOTES
"Audio/visual Telehealth Visit     The patient location is:  [place]  The chief complaint leading to consultation is: headaches, elevated bp  Visit type:  Synchronous audio visual  Total time spent with patient: 15 min     Each patient to whom I provide medical services by telemedicine is:  (1) informed of the relationship between the physician and patient and the respective role of any other health care provider with respect to management of the patient; and (2) notified that they may decline to receive medical services by telemedicine and may withdraw from such care at any time. Patient verbally consented to receive this service via audio/visual call.      Subjective:   Patient ID: Channing Lennon is a 32 y.o. male.    Chief Complaint: headaches, elevated bp     HPI: 06/20/2023: Patient presents for evaluation of headache. Symptoms began about several months ago. Generally, the headaches last about 5 hours and occur once per week. The headaches do not seem to be related to any time of the day. The headaches are usually moderate and dull.  The patient rates his most severe headaches a 7 on a scale from 1 to 10. Recently, the headaches have been decreasing in severity. Work attendance or other daily activities are not affected by the headaches. Precipitating factors include: stress and elevated bp. The headaches are usually not preceded by an aura. Associated neurologic symptoms: depression. The patient denies decreased physical activity, dizziness, loss of balance, muscle weakness, numbness of extremities, speech difficulties, vision problems, vomiting in the early morning, and worsening school/work performance. Home treatment has included Fiorinal, darkening the room, and resting with some improvement. Other history includes: headaches of unknown type diagnosed in the past and allergic rhinitis.  He took his blood pressure during headaches as suggested, states that blood pressure readings were "elevated".  Does not " remember exact numbers but states that they were above 140/90.        06/06/2023:  Self scheduled virtual visit today with complaint of mixed tension and migraine headaches. Home treatment has included Tylenol with codeine and OTC ibuprofen with some improvement. Headaches are occurring once per week. Generally, the headaches last about several hours. Work attendance or other daily activities are affected by the headaches.  Patient also states that he recently stopped Zoloft and increase Prozac as ordered by his psychiatrist, is not sure if that is what is causing his headaches.  The patient denies dizziness, loss of balance, muscle weakness, numbness of extremities, speech difficulties, vision problems, and vomiting in the early morning.    Patient also requesting referral for removal of skin tags around anus.  Denies any known STI infections or genital warts.  States they are very irritating and notices the most when he is having about    02/27/2023: patient here today for routine follow up of anxiety, depression (managed now by psych) and anemia. States he has been feeling much better recently and is in town for a few weeks to visit family. Denies SI/HI or episodes of sergey. Is inquiring about genetic testing to ensure he is on correct antidepressant. Currently working with psychotherapist. Has also been taking daily OTC iron supplement but denies any improvement to fatigue.     10/26/2022:  Today to review labs with patient, all questions answered to patient's understanding.  Patient did state that he has been anemic previously on blood work but there was no known cause or issues.  No pertinent family history correlated to this.  Will recheck this level again in 3 months face-to-face to ensure that this is a stable level.  Patient missed his appointment with psychiatrist to establish care and have medication management, he has reschedule this for December nose requesting refills of his antidepressants until this  appointment.    10/11/2022:  Patient presents to clinic today to establish care.  He recently lost private insurance and has been having to find new providers that except his Medicaid.  He has history of anxiety and depression which is controlled with Zoloft and Valium p.r.n..  At length discussed with patient how that is not medicine that be filled in primary care setting and we can set him up with a different psychiatrist for formal evaluation and treatment.  Patient is open to this plan.  [place] discussion with patient about needing to be in state in order to effectively manage conditions.  Unsure of any previous health maintenance the patient denies any acute complaints or family histories      ROS:  Review of Systems   Constitutional:  Negative for chills, fever and weight loss.   HENT:  Negative for ear discharge, nosebleeds and tinnitus.    Eyes:  Negative for blurred vision, photophobia and pain.   Respiratory:  Negative for cough, shortness of breath, wheezing and stridor.    Cardiovascular:  Negative for chest pain, palpitations and orthopnea.   Gastrointestinal:  Negative for abdominal pain, heartburn and nausea.   Genitourinary:  Negative for dysuria, frequency, hematuria and urgency.   Musculoskeletal:  Negative for falls and myalgias.   Skin:  Negative for itching and rash.        Skin tags   Neurological:  Positive for headaches. Negative for dizziness, sensory change, speech change, focal weakness, seizures and weakness.   Endo/Heme/Allergies:  Negative for environmental allergies. Does not bruise/bleed easily.   Psychiatric/Behavioral:  Negative for hallucinations and suicidal ideas.     History:   No past medical history on file.   No past surgical history on file.  No family history on file.   Social History     Tobacco Use    Smoking status: Never    Smokeless tobacco: Never   Substance and Sexual Activity    Alcohol use: Yes     Alcohol/week:  1.0 standard drink     Types: 1 Drinks containing 0.5 oz of alcohol per week    Drug use: Yes     Types: Marijuana     Comment: at night    Sexual activity: Not Currently        Allergies: Review of patient's allergies indicates:  No Known Allergies  Objective:   There were no vitals filed for this visit.  There is no height or weight on file to calculate BMI.     Physical Examination:   Physical Exam  Constitutional:       General: He is not in acute distress.     Comments: Limited Physical Exam due to telemedicine visit   Pulmonary:      Effort: Pulmonary effort is normal. No respiratory distress.   Neurological:      Mental Status: He is alert.   Psychiatric:         Mood and Affect: Mood normal.         Behavior: Behavior normal.       Assessment:     Problem List Items Addressed This Visit          Neuro    Acute nonintractable headache    Current Assessment & Plan     Headache education provided - including good sleep hygiene, stress management, medication overuse education provided - using more 3 OTC per week may worsen headaches, High intensity interval training has shown to reduce headache frequency. Low carb, high protein has shown to reduce headache frequency. Patient is instructed to keep headache diary.            Other Visit Diagnoses       Elevated BP without diagnosis of hypertension    -  Primary    Trial 0.1 mg clonidine as needed for symptomatic hypertensive readings.  Patient will report any increase in frequency    Relevant Medications    cloNIDine (CATAPRES) 0.1 MG tablet            Plan:   Diagnoses and all orders for this visit:    Elevated BP without diagnosis of hypertension  Comments:  Trial 0.1 mg clonidine as needed for symptomatic hypertensive readings.  Patient will report any increase in frequency  Orders:  -     cloNIDine (CATAPRES) 0.1 MG tablet; Take 1 tablet (0.1 mg total) by mouth 2 (two) times daily as needed (hypertension).    Acute nonintractable headache, unspecified headache  type       Follow up in about 3 months (around 9/20/2023).       This note was created with the assistance of a voice recognition software or phone dictation. There may be transcription errors as a result of using this technology however minimal. Effort has been made to assure accuracy of transcription but any obvious errors or omissions should be clarified with the author of the document      This service was not originating from a related E/M service provided within the previous 7 days nor will  to an E/M service or procedure within the next 24 hours or my soonest available appointment.  Prevailing standard of care was able to be met in this audio visual visit.

## 2023-08-30 ENCOUNTER — TELEPHONE (OUTPATIENT)
Dept: FAMILY MEDICINE | Facility: CLINIC | Age: 33
End: 2023-08-30
Payer: MEDICAID

## 2023-08-30 DIAGNOSIS — F33.41 RECURRENT MAJOR DEPRESSIVE DISORDER, IN PARTIAL REMISSION: ICD-10-CM

## 2023-08-30 DIAGNOSIS — F41.1 GAD (GENERALIZED ANXIETY DISORDER): ICD-10-CM

## 2023-08-30 RX ORDER — BUSPIRONE HYDROCHLORIDE 10 MG/1
10 TABLET ORAL 3 TIMES DAILY
Qty: 270 TABLET | Refills: 1 | Status: SHIPPED | OUTPATIENT
Start: 2023-08-30 | End: 2023-12-18 | Stop reason: SDUPTHER

## 2023-08-30 RX ORDER — FLUOXETINE HYDROCHLORIDE 40 MG/1
80 CAPSULE ORAL DAILY
Qty: 180 CAPSULE | Refills: 1 | Status: SHIPPED | OUTPATIENT
Start: 2023-08-30 | End: 2023-12-18 | Stop reason: SDUPTHER

## 2023-12-12 ENCOUNTER — OFFICE VISIT (OUTPATIENT)
Dept: SURGERY | Facility: CLINIC | Age: 33
End: 2023-12-12
Payer: COMMERCIAL

## 2023-12-12 VITALS
OXYGEN SATURATION: 96 % | SYSTOLIC BLOOD PRESSURE: 115 MMHG | WEIGHT: 113 LBS | HEIGHT: 62 IN | DIASTOLIC BLOOD PRESSURE: 72 MMHG | BODY MASS INDEX: 20.8 KG/M2 | RESPIRATION RATE: 18 BRPM | HEART RATE: 94 BPM | TEMPERATURE: 98 F

## 2023-12-12 DIAGNOSIS — K64.4 SKIN TAG OF ANUS: ICD-10-CM

## 2023-12-12 DIAGNOSIS — K64.4 EXTERNAL HEMORRHOIDS: Primary | ICD-10-CM

## 2023-12-12 PROCEDURE — 99214 OFFICE O/P EST MOD 30 MIN: CPT | Mod: PBBFAC

## 2023-12-12 NOTE — PROGRESS NOTES
[facility]    HPI: Channing Lennon is a 33 y.o. male with a history of headaches, anemia, and anxiety/depression who presents to clinic for evaluation of possible anal skin tag. Patient cannot recall when he first noticed the mass near his anus, stating that it has been present for as long as he can remember. He reports intermittent bleeding with wiping (approximately once per week), but denies any blood in his stool or associated pain. Patient denies any prior diagnoses of hemorrhoids or IBS/IBD. He denies tobacco use, but reports smoking marijuana several times per week and occasional alcohol use. Previous history of right hand surgery for repair of fracture.     PMH: History reviewed. No pertinent past medical history.     Meds:   Current Outpatient Medications:     busPIRone (BUSPAR) 10 MG tablet, Take 1 tablet (10 mg total) by mouth 3 (three) times daily., Disp: 270 tablet, Rfl: 1    diazePAM (VALIUM) 10 MG Tab, Take 1 tablet (10 mg total) by mouth daily as needed (anxiety or panic attacks)., Disp: 30 tablet, Rfl: 1    FLUoxetine 40 MG capsule, Take 2 capsules (80 mg total) by mouth once daily., Disp: 180 capsule, Rfl: 1    cloNIDine (CATAPRES) 0.1 MG tablet, Take 1 tablet (0.1 mg total) by mouth 2 (two) times daily as needed (hypertension)., Disp: 60 tablet, Rfl: 2    Allergies: Review of patient's allergies indicates:  No Known Allergies    Social History:   Social History     Tobacco Use    Smoking status: Never    Smokeless tobacco: Never   Substance Use Topics    Alcohol use: Yes     Alcohol/week: 1.0 standard drink of alcohol     Types: 1 Drinks containing 0.5 oz of alcohol per week    Drug use: Yes     Types: Marijuana     Comment: at night     Family History: History reviewed. No pertinent family history.  Surgical History: History reviewed. No pertinent surgical history.    Review of Systems:  Negative unless otherwise stated in HPI.    Objective:    Vitals:  Vitals:    12/12/23 1204    BP: 115/72   Pulse: 94   Resp: 18   Temp: 97.9 °F (36.6 °C)        Physical Exam:  Gen: NAD  Neuro: awake, alert, answering questions appropriately  CV: RR, +2 radial pulses.  Resp: non-labored breathing on room air  Abd: soft, ND, NT  : Approximately grade 1-2, 3-4 external hemorrhoids  left lateral   Ext: moves all 4 spontaneously and purposefully  Skin: warm, well perfused    Assessment/Plan:  Channing Lennon is a 33 y.o. male with a history of headaches, anemia, and anxiety/depression who presents to clinic with anal masses concerning for external hemorrhoids.    - Increase water intake to 2 L per day  - Begin daily fiber supplementation, prescription provided   - Educated on importance of limiting time on toilet and reducing straining. Recommend spending no more than 5 minutes on toilet.   - RTC in 3 months for follow up     Theresa Dickinson, MS3  12/12/2023 12:12 PM     I have reviewed and concur with the medical student's history, physical, assessment, and plan.  I have personally interviewed and examined the patient at bedside. I have made corrections in the note as needed.    Tamica Sawyer MD  [employer] General [facility] PGY1

## 2023-12-12 NOTE — PROGRESS NOTES
Pt seen by Dr. Sawyer; Pt instructed to return to clinic in 3 months; Discharge paperwork given w/pt verbalizing understanding

## 2023-12-18 ENCOUNTER — OFFICE VISIT (OUTPATIENT)
Dept: BEHAVIORAL HEALTH | Facility: CLINIC | Age: 33
End: 2023-12-18
Payer: COMMERCIAL

## 2023-12-18 VITALS
DIASTOLIC BLOOD PRESSURE: 70 MMHG | SYSTOLIC BLOOD PRESSURE: 116 MMHG | HEART RATE: 66 BPM | WEIGHT: 112.81 LBS | OXYGEN SATURATION: 98 % | BODY MASS INDEX: 20.63 KG/M2 | TEMPERATURE: 99 F

## 2023-12-18 DIAGNOSIS — F41.1 GAD (GENERALIZED ANXIETY DISORDER): Primary | ICD-10-CM

## 2023-12-18 DIAGNOSIS — F33.41 RECURRENT MAJOR DEPRESSIVE DISORDER, IN PARTIAL REMISSION: ICD-10-CM

## 2023-12-18 PROCEDURE — 99213 OFFICE O/P EST LOW 20 MIN: CPT | Mod: PBBFAC,PN | Performed by: STUDENT IN AN ORGANIZED HEALTH CARE EDUCATION/TRAINING PROGRAM

## 2023-12-18 PROCEDURE — 1159F PR MEDICATION LIST DOCUMENTED IN MEDICAL RECORD: ICD-10-PCS | Mod: CPTII,,, | Performed by: STUDENT IN AN ORGANIZED HEALTH CARE EDUCATION/TRAINING PROGRAM

## 2023-12-18 PROCEDURE — 99213 OFFICE O/P EST LOW 20 MIN: CPT | Mod: S$PBB,,, | Performed by: STUDENT IN AN ORGANIZED HEALTH CARE EDUCATION/TRAINING PROGRAM

## 2023-12-18 PROCEDURE — 3008F PR BODY MASS INDEX (BMI) DOCUMENTED: ICD-10-PCS | Mod: CPTII,,, | Performed by: STUDENT IN AN ORGANIZED HEALTH CARE EDUCATION/TRAINING PROGRAM

## 2023-12-18 PROCEDURE — 3008F BODY MASS INDEX DOCD: CPT | Mod: CPTII,,, | Performed by: STUDENT IN AN ORGANIZED HEALTH CARE EDUCATION/TRAINING PROGRAM

## 2023-12-18 PROCEDURE — 99213 PR OFFICE/OUTPT VISIT, EST, LEVL III, 20-29 MIN: ICD-10-PCS | Mod: S$PBB,,, | Performed by: STUDENT IN AN ORGANIZED HEALTH CARE EDUCATION/TRAINING PROGRAM

## 2023-12-18 PROCEDURE — 3074F SYST BP LT 130 MM HG: CPT | Mod: CPTII,,, | Performed by: STUDENT IN AN ORGANIZED HEALTH CARE EDUCATION/TRAINING PROGRAM

## 2023-12-18 PROCEDURE — 1160F PR REVIEW ALL MEDS BY PRESCRIBER/CLIN PHARMACIST DOCUMENTED: ICD-10-PCS | Mod: CPTII,,, | Performed by: STUDENT IN AN ORGANIZED HEALTH CARE EDUCATION/TRAINING PROGRAM

## 2023-12-18 PROCEDURE — 3078F DIAST BP <80 MM HG: CPT | Mod: CPTII,,, | Performed by: STUDENT IN AN ORGANIZED HEALTH CARE EDUCATION/TRAINING PROGRAM

## 2023-12-18 PROCEDURE — 3074F PR MOST RECENT SYSTOLIC BLOOD PRESSURE < 130 MM HG: ICD-10-PCS | Mod: CPTII,,, | Performed by: STUDENT IN AN ORGANIZED HEALTH CARE EDUCATION/TRAINING PROGRAM

## 2023-12-18 PROCEDURE — 1159F MED LIST DOCD IN RCRD: CPT | Mod: CPTII,,, | Performed by: STUDENT IN AN ORGANIZED HEALTH CARE EDUCATION/TRAINING PROGRAM

## 2023-12-18 PROCEDURE — 3078F PR MOST RECENT DIASTOLIC BLOOD PRESSURE < 80 MM HG: ICD-10-PCS | Mod: CPTII,,, | Performed by: STUDENT IN AN ORGANIZED HEALTH CARE EDUCATION/TRAINING PROGRAM

## 2023-12-18 PROCEDURE — 1160F RVW MEDS BY RX/DR IN RCRD: CPT | Mod: CPTII,,, | Performed by: STUDENT IN AN ORGANIZED HEALTH CARE EDUCATION/TRAINING PROGRAM

## 2023-12-18 RX ORDER — BUSPIRONE HYDROCHLORIDE 10 MG/1
10 TABLET ORAL 3 TIMES DAILY
Qty: 270 TABLET | Refills: 1 | Status: SHIPPED | OUTPATIENT
Start: 2023-12-18 | End: 2024-12-17

## 2023-12-18 RX ORDER — FLUOXETINE HYDROCHLORIDE 40 MG/1
80 CAPSULE ORAL DAILY
Qty: 180 CAPSULE | Refills: 1 | Status: SHIPPED | OUTPATIENT
Start: 2023-12-18 | End: 2024-02-23 | Stop reason: SDUPTHER

## 2023-12-18 RX ORDER — DIAZEPAM 10 MG/1
10 TABLET ORAL DAILY PRN
Qty: 30 TABLET | Refills: 1 | Status: SHIPPED | OUTPATIENT
Start: 2023-12-18

## 2023-12-18 NOTE — PROGRESS NOTES
"  Outpatient Psychiatry Follow-Up Visit    12/18/2023    Clinical Status of Patient:  Outpatient (Ambulatory)    Chief Complaint:  Channing Lennno is a 33 y.o. male who presents today for follow-up of depression and anxiety. Patient last seen for follow-up on 5/30/2023. Met with patient.      Interval History and Content of Current Session:  Interim Events/Subjective Report/Content of Current Session:   Pt reports doing "ok"  overall.  Reports "spaced out" mood, +emotional blunting, "ok" anxiety.  Denies panic attacks.  Sleep "fine". Appetite "good", weight stable.  Energy low during daytime, motivation low.  Occasional irritability, occasional hopelessness.  Denies SI/HI/AVH/paranoia, denies plan or desire for self harm or harm to others.  Denies SE from current regimen. Denies somatic complaints.   Pt voices desire to adjust regimen to address ongoing symptoms.      Psychiatric Review of Systems-is patient experiencing or having changes in  See HPI above.     Review of Systems   PSYCHIATRIC: Pertinant items are noted in the narrative.  CONSTITUTIONAL: No weight gain or loss.  MUSCULOSKELETAL: No pain or stiffness of the joints.  NEUROLOGIC: No weakness, sensory changes, seizures, confusion, memory loss, tremor or other abnormal movements.  CARDIAC: No CP, no palpitations  RESPIRATORY: No shortness of breath.  CARDIOVASCULAR: No tachycardia or chest pain.  GASTROINTESTINAL: No nausea, vomiting, pain, constipation or diarrhea.    Past Medical, Family and Social History: The patient's past medical, family and social history have been reviewed and updated as appropriate within the electronic medical record - see encounter notes.    Compliance: good    Side effects: denies    Risk Parameters:  Patient reports no suicidal ideation  Patient reports no homicidal ideation  Patient reports no self-injurious behavior  Patient reports no violent behavior    Exam (detailed: at least 9 elements; comprehensive: all 15 elements) " "  Constitutional  Vitals:  Most recent vital signs, dated less than 90 days prior to this appointment, were reviewed.     Vitals:    12/18/23 1105   BP: 116/70   Pulse: 66   Temp: 98.7 °F (37.1 °C)   SpO2: 98%   Weight: 51.2 kg (112 lb 12.8 oz)        General:   Constitutional: No acute distress, appears stated age, casually dressed    Neurologic:   Motor: moves all extremities spontaneously and without difficulty, no abnormal involuntary movements observed  Gait: normal gait and station    Mental status examination:   Appearance: appears stated age, casually dressed, no acute distress  Behavior: unremarkable for situation, calm and cooperative  Mood: "alright"  Affect: mood congruent, constricted and anxious-appearing  Thought process: linear and goal directed  Associations: appropriate for conversation  Thought content: no plan or desire for self harm or harm to others, denies paranoia, no delusional ideation volunteered  Perceptions: denies hallucinations or other altered perceptions  Orientation: oriented to day of week, month, year, location, and situation  Language: English, fluid  Attention: able to attend to interview  Insight: good  Judgement: good    PHQ9:  Over the last two weeks how often have you been bothered by little interest or pleasure in doing things: 2  Over the last two weeks how often have you been bothered by feeling down, depressed or hopeless: 1  PHQ-2 Total Score: 3  PHQ-9 Score: 11  PHQ-9 Interpretation: Moderate    GAD7:      12/18/2023    11:16 AM 5/30/2023    11:14 AM 2/27/2023     2:47 PM   GAD7   1. Feeling nervous, anxious, or on edge? 1 1 3   2. Not being able to stop or control worrying? 1 3 3   3. Worrying too much about different things? 1 3 3   4. Trouble relaxing? 1 2 2   5. Being so restless that it is hard to sit still? 3 3 0   6. Becoming easily annoyed or irritable? 2 2 1   7. Feeling afraid as if something awful might happen? 3 3 3   8. If you checked off any problems, how " difficult have these problems made it for you to do your work, take care of things at home, or get along with other people? 1 2 2   PERNELL-7 Score 12 17 15     Assessment and Diagnosis   Status/Progress: Based on the examination today, the patient's problem(s) is/are adequately but not ideally controlled.  New problems have been presented today (emotional blunting).   Co-morbidities and Lack of compliance are not complicating management of the primary condition.  Number of separate conditions addressed during today's visit: 2 (anxiety well controlled, mood fair control but with SE of treatment).  Are medication adjustments being made today: No.  Are referral(s) being ordered today: No.  Complexity (level) of medical decision making employed in the encounter: MODERATE.    General Impression:    ICD-10-CM ICD-9-CM   1. PERNELL (generalized anxiety disorder)  F41.1 300.02   2. Recurrent major depressive disorder, in partial remission  F33.41 296.35       Intervention/Counseling/Treatment Plan   Continue prozac 80mg daily, discussed option to try to decreasing to address emotional blunting, pt to consider  Continue buspirone 10mg tid  Continue PRN valium, PDMP reviewed and demonstrated no abnormal filling patterns.   Recommend pt continue with current counselor  No need for PEC as pt is not an imminent danger to self or others or gravely disabled due to acute psychiatric illness  Discussed that pt should either call clinic for psychiatric crisis symptoms or present to nearest emergency room    Discussed with patient informed consent including diagnosis, risks and benefits of proposed treatment above vs. alternative treatments vs. no treatment, as well as serious and common side effects of these treatments, and the inherent unpredictability of individual responses to these treatments. The patient expresses understanding of the above and displays the capacity to agree with this current plan. Patient also agrees that, currently,  the benefits outweigh the risks and would like to pursue treatment at this time, and had no other questions.    Instructions:  Take all medications as prescribed.    Abstain from recreational drugs and alcohol.  Present to ED or call 911 for SI/HI plan or intent, psychosis, or medical emergency.    Return to Clinic: Follow up in about 3 months (around 3/18/2024).    Total time:     The total time for services performed on the date of the encounter: 24 minutes    Chad Youssef MD  [facility]

## 2024-02-23 ENCOUNTER — PATIENT MESSAGE (OUTPATIENT)
Dept: BEHAVIORAL HEALTH | Facility: CLINIC | Age: 34
End: 2024-02-23
Payer: MEDICAID

## 2024-02-23 DIAGNOSIS — F33.41 RECURRENT MAJOR DEPRESSIVE DISORDER, IN PARTIAL REMISSION: ICD-10-CM

## 2024-02-23 DIAGNOSIS — F41.1 GAD (GENERALIZED ANXIETY DISORDER): ICD-10-CM

## 2024-02-23 RX ORDER — FLUOXETINE HYDROCHLORIDE 20 MG/1
60 CAPSULE ORAL DAILY
Qty: 270 CAPSULE | Refills: 1 | Status: SHIPPED | OUTPATIENT
Start: 2024-02-23 | End: 2024-05-09 | Stop reason: SDUPTHER

## 2024-04-14 ENCOUNTER — PATIENT MESSAGE (OUTPATIENT)
Dept: BEHAVIORAL HEALTH | Facility: CLINIC | Age: 34
End: 2024-04-14
Payer: MEDICAID

## 2024-05-09 ENCOUNTER — OFFICE VISIT (OUTPATIENT)
Dept: BEHAVIORAL HEALTH | Facility: CLINIC | Age: 34
End: 2024-05-09
Payer: COMMERCIAL

## 2024-05-09 DIAGNOSIS — F41.1 GAD (GENERALIZED ANXIETY DISORDER): ICD-10-CM

## 2024-05-09 DIAGNOSIS — F33.41 RECURRENT MAJOR DEPRESSIVE DISORDER, IN PARTIAL REMISSION: ICD-10-CM

## 2024-05-09 DIAGNOSIS — F90.9 ADULT ADHD: Primary | ICD-10-CM

## 2024-05-09 PROCEDURE — 99499 UNLISTED E&M SERVICE: CPT | Mod: 95,,, | Performed by: STUDENT IN AN ORGANIZED HEALTH CARE EDUCATION/TRAINING PROGRAM

## 2024-05-09 RX ORDER — DIAZEPAM 10 MG/1
10 TABLET ORAL DAILY PRN
Qty: 30 TABLET | Refills: 2 | Status: SHIPPED | OUTPATIENT
Start: 2024-05-09

## 2024-05-09 RX ORDER — FLUOXETINE HYDROCHLORIDE 20 MG/1
60 CAPSULE ORAL DAILY
Qty: 270 CAPSULE | Refills: 1 | Status: SHIPPED | OUTPATIENT
Start: 2024-05-09 | End: 2024-11-05

## 2024-05-09 RX ORDER — ATOMOXETINE 40 MG/1
CAPSULE ORAL
Qty: 60 CAPSULE | Refills: 5 | Status: SHIPPED | OUTPATIENT
Start: 2024-05-09

## 2024-05-09 RX ORDER — BUSPIRONE HYDROCHLORIDE 10 MG/1
10 TABLET ORAL 3 TIMES DAILY
Qty: 270 TABLET | Refills: 1 | Status: SHIPPED | OUTPATIENT
Start: 2024-05-09 | End: 2025-05-09

## 2024-05-09 RX ORDER — ATOMOXETINE 25 MG/1
CAPSULE ORAL
Qty: 42 CAPSULE | Refills: 0 | Status: SHIPPED | OUTPATIENT
Start: 2024-05-09

## 2024-05-09 NOTE — PROGRESS NOTES
Patient presented virtually for scheduled follow-up appointment.  Unbeknownst to provider, patient physically located in [place] for new employment.  Discussed that I am unable to conduct a formal appointments as I only have a license to practice in [place].  Patient voiced understanding.  Patient voiced interest in adjusting regimen to address attention symptoms.  We will trial atomoxetine as nonstimulant treatment.  But discussed that is we will likely need to meet for formal appointments for ADHD evaluation and discussion of other treatments if necessary.  Patient in agreement with this plan.  We will defer scheduling follow-up appointments until patient's nose when he will be back in [place].  All questions answered.

## 2024-05-20 ENCOUNTER — TELEPHONE (OUTPATIENT)
Dept: BEHAVIORAL HEALTH | Facility: CLINIC | Age: 34
End: 2024-05-20
Payer: MEDICAID

## 2024-05-20 NOTE — TELEPHONE ENCOUNTER
----- Message from Анна Viveros sent at 5/20/2024 10:54 AM CDT -----  Regarding: pharm request diagnostic code  Pharm request diagnostic code for atomoxetine (STRATTERA) 25 MG capsule

## 2024-06-06 ENCOUNTER — PATIENT MESSAGE (OUTPATIENT)
Dept: BEHAVIORAL HEALTH | Facility: CLINIC | Age: 34
End: 2024-06-06
Payer: MEDICAID

## 2024-09-12 ENCOUNTER — TELEPHONE (OUTPATIENT)
Dept: BEHAVIORAL HEALTH | Facility: CLINIC | Age: 34
End: 2024-09-12
Payer: MEDICAID

## 2024-09-12 DIAGNOSIS — F41.1 GAD (GENERALIZED ANXIETY DISORDER): Primary | ICD-10-CM

## 2024-09-12 RX ORDER — DIAZEPAM 10 MG/1
10 TABLET ORAL DAILY PRN
Qty: 30 TABLET | Refills: 1 | Status: SHIPPED | OUTPATIENT
Start: 2024-09-12

## 2024-09-12 NOTE — PROGRESS NOTES
Received refill request for valium.  PDMP reviewed and demonstrated no abnormal filling patterns.  Refill submitted as requested.  Will follow up appointment needed.

## 2024-09-12 NOTE — TELEPHONE ENCOUNTER
LVM stating script was sent in but that pt needs to contact office to schedule a follow up appt. F2F or virtual is fine.

## 2025-01-27 ENCOUNTER — PATIENT MESSAGE (OUTPATIENT)
Dept: BEHAVIORAL HEALTH | Facility: CLINIC | Age: 35
End: 2025-01-27

## 2025-01-27 DIAGNOSIS — F41.1 GAD (GENERALIZED ANXIETY DISORDER): ICD-10-CM

## 2025-01-27 DIAGNOSIS — F33.41 RECURRENT MAJOR DEPRESSIVE DISORDER, IN PARTIAL REMISSION: ICD-10-CM

## 2025-02-03 RX ORDER — FLUOXETINE HYDROCHLORIDE 20 MG/1
60 CAPSULE ORAL DAILY
Qty: 270 CAPSULE | Refills: 1 | Status: SHIPPED | OUTPATIENT
Start: 2025-02-03 | End: 2025-08-02

## 2025-07-21 ENCOUNTER — TELEPHONE (OUTPATIENT)
Dept: FAMILY MEDICINE | Facility: CLINIC | Age: 35
End: 2025-07-21